# Patient Record
Sex: FEMALE | Race: WHITE | NOT HISPANIC OR LATINO | Employment: OTHER | URBAN - METROPOLITAN AREA
[De-identification: names, ages, dates, MRNs, and addresses within clinical notes are randomized per-mention and may not be internally consistent; named-entity substitution may affect disease eponyms.]

---

## 2018-03-30 ENCOUNTER — TRANSFERRED RECORDS (OUTPATIENT)
Dept: HEALTH INFORMATION MANAGEMENT | Facility: CLINIC | Age: 78
End: 2018-03-30

## 2018-04-03 LAB — POTASSIUM SERPL-SCNC: 4 MMOL/L (ref 3.5–5)

## 2018-04-04 ENCOUNTER — NURSING HOME VISIT (OUTPATIENT)
Dept: GERIATRICS | Facility: CLINIC | Age: 78
End: 2018-04-04
Payer: COMMERCIAL

## 2018-04-04 VITALS
OXYGEN SATURATION: 96 % | SYSTOLIC BLOOD PRESSURE: 129 MMHG | WEIGHT: 167 LBS | HEART RATE: 66 BPM | BODY MASS INDEX: 25.31 KG/M2 | DIASTOLIC BLOOD PRESSURE: 67 MMHG | RESPIRATION RATE: 16 BRPM | TEMPERATURE: 96.9 F | HEIGHT: 68 IN

## 2018-04-04 DIAGNOSIS — I73.9 PAD (PERIPHERAL ARTERY DISEASE) (H): ICD-10-CM

## 2018-04-04 DIAGNOSIS — I62.00 SUBDURAL HEMORRHAGE (H): Primary | ICD-10-CM
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
## 2018-04-04 DIAGNOSIS — S02.2XXD CLOSED FRACTURE OF NASAL BONE WITH ROUTINE HEALING: ICD-10-CM

## 2018-04-04 DIAGNOSIS — N39.0 URINARY TRACT INFECTION WITHOUT HEMATURIA, SITE UNSPECIFIED: ICD-10-CM

## 2018-04-04 DIAGNOSIS — I70.1 RENAL ARTERY STENOSIS (H): ICD-10-CM

## 2018-04-04 DIAGNOSIS — I60.9 SUBARACHNOID HEMORRHAGE (H): ICD-10-CM

## 2018-04-04 DIAGNOSIS — R53.81 PHYSICAL DECONDITIONING: ICD-10-CM

## 2018-04-04 DIAGNOSIS — N18.30 CKD (CHRONIC KIDNEY DISEASE) STAGE 3, GFR 30-59 ML/MIN (H): ICD-10-CM

## 2018-04-04 DIAGNOSIS — I10 ESSENTIAL HYPERTENSION: ICD-10-CM

## 2018-04-04 DIAGNOSIS — Z71.89 ADVANCED DIRECTIVES, COUNSELING/DISCUSSION: ICD-10-CM

## 2018-04-04 PROBLEM — N18.9 CHRONIC RENAL INSUFFICIENCY: Status: ACTIVE | Noted: 2018-04-04

## 2018-04-04 PROBLEM — R73.03 PRE-DIABETES: Status: ACTIVE | Noted: 2018-04-04

## 2018-04-04 PROBLEM — E78.2 MIXED HYPERLIPIDEMIA: Status: ACTIVE | Noted: 2018-04-04

## 2018-04-04 PROBLEM — R82.90 ABNORMAL URINALYSIS: Status: ACTIVE | Noted: 2018-04-04

## 2018-04-04 PROBLEM — R33.9 URINARY RETENTION: Status: ACTIVE | Noted: 2018-04-04

## 2018-04-04 PROBLEM — E88.810 METABOLIC SYNDROME X: Status: ACTIVE | Noted: 2018-04-04

## 2018-04-04 PROBLEM — G62.9 PERIPHERAL NEUROPATHY: Status: ACTIVE | Noted: 2018-04-04
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
## 2018-04-04 PROCEDURE — 99310 SBSQ NF CARE HIGH MDM 45: CPT | Performed by: NURSE PRACTITIONER

## 2018-04-04 RX ORDER — AMPICILLIN TRIHYDRATE 500 MG
1 CAPSULE ORAL DAILY
COMMUNITY
End: 2021-10-15

## 2018-04-04 RX ORDER — MULTIPLE VITAMINS W/ MINERALS TAB 9MG-400MCG
1 TAB ORAL DAILY
COMMUNITY
End: 2021-10-15

## 2018-04-04 NOTE — PROGRESS NOTES
[facility]  PRIMARY CARE PROVIDER AND CLINIC:  Devorah Aguilar MD  Chief Complaint   Patient presents with     Hospital F/U       HPI:    Sweetie Thao is a 77 year old  (1940),admitted to the [facility] TCU from [facility] .  Hospital stay 03/30/2018 through 04/03/2018.  Admitted to this facility for  rehab, medical management and nursing care.  HPI information obtained from: facility chart records, facility staff, patient report, [facility] chart review and Care Everywhere [facility] chart review.    She has a past medical history significant for PAD s/p angioplasty of right SFA, on plavix, renal artery stenosis, HTN, CKD and was hospitalized with facial trauma after falling outside on her way into [employer]. She missed a step and landed face down on the ground. CT head showed 3 mm subdural hematoma along the left tentorium, 2 mm subdural hematoma along the right aspect of the anterior faix cerebri,  1 cm subarachnoid hemorrhage along the right superior frontal gyrus, small subarachnoid hemorrhage of the right frontal lobe and minimally displaced left nasal bone fracture. Neurosurgery consulted and Plavix is on hold until follow up CT in 2 weeks.   UA showed >100 WBC and positive leuk estrace. She has a history of recurrent UTI and ESBL. UC >100,000 enterococcus. She was treated with IV ampicillin and discharged on amoxicillin.   Vascular Surgery consulted due to PAD. JASON on 4/2/2018 showed right mildly diminished resting JASON and patent pedal arteries. Left LE with borderline diminishing resting JASON.       Current issues are:         Subdural hemorrhage (H)-reports pain and mild headache are controlled. She has noticed slight word finding difficulty, but feels her cognition is at baseline. Has left rib pain and a flare of left sciatica pain, relieved with oxycodone. Also receiving aleve per hospital orders.   Subarachnoid hemorrhage (H)  Closed fracture of nasal  bone with routine healing-congestion and some difficulty breathing through her nose  Fall, subsequent encounter  PAD (peripheral artery disease) (H)-denies pain   Renal artery stenosis (H)  Essential hypertension-BPs: : 129/67, 118/71, 133/66   HR: 61-70  CKD (chronic kidney disease) stage 3, GFR 30-59 ml/min  Urinary tract infection without hematuria, site unspecified-reports symptoms are improving. Afebrile.   Physical deconditioning-up with walker and assist. Requires stand by assist with cares.     CODE STATUS/ADVANCE DIRECTIVES DISCUSSION:   CPR/Full code   Patient's living condition: lives alone    ALLERGIES:Amitriptyline; Contrast dye; Cymbalta; Dust mites; Gabapentin; Levofloxacin; Mold; and Nortriptyline  PAST MEDICAL HISTORY:  has a past medical history of Allergic rhinitis; Chronic renal insufficiency; Edema of both legs; Endometriosis; Essential hypertension; HDL lipoprotein deficiency; HTN (hypertension); Hypertriglyceridemia; Idiopathic neuropathy; Left knee DJD; Peripheral neuropathy; Pre-diabetes; Rash; Rectal polyp; Renal artery stenosis (H); and Toe ulcer, right (H).  PAST SURGICAL HISTORY:  has no past surgical history on file.  FAMILY HISTORY: family history includes Prostate Cancer in her father.  SOCIAL HISTORY:      Post Discharge Medication Reconciliation Status: discharge medications reconciled, continue medications without change.  Current Outpatient Prescriptions   Medication Sig Dispense Refill     ACETAMINOPHEN PO Take 650 mg by mouth every 4 hours as needed for pain       AMOXICILLIN PO Take 500 mg by mouth 2 times daily Until 4/13/18       ATORVASTATIN CALCIUM PO Take 80 mg by mouth daily       VITAMIN D, CHOLECALCIFEROL, PO Take 1,000 Units by mouth daily       Cranberry 450 MG CAPS Take 1 tablet by mouth daily       LACTOBACILLUS RHAMNOSUS, GG, PO Take 1 capsule by mouth daily       DOXAZOSIN MESYLATE PO Take 4 mg by mouth daily       HYDROCHLOROTHIAZIDE PO Take 25 mg by mouth daily   "     LISINOPRIL PO Take 20 mg by mouth 2 times daily       METOPROLOL TARTRATE PO Take 50 mg by mouth 2 times daily       multivitamin, therapeutic with minerals (MULTI-VITAMIN) TABS tablet Take 1 tablet by mouth daily       OMEGA 3-6-9 FATTY ACIDS PO Take 1 capsule by mouth daily       OXYCODONE HCL PO Take 5 mg by mouth every 4 hours as needed       PREGABALIN PO Take 75 mg by mouth 2 times daily       SPIRONOLACTONE PO Take 25 mg by mouth daily       vitamin B complex with vitamin C (VITAMIN  B COMPLEX) TABS tablet Take 1 tablet by mouth daily       Zinc 50 MG CAPS Take 50 mg by mouth daily         ROS:  10 point ROS of systems including Constitutional, Eyes, Respiratory, Cardiovascular, Gastroenterology, Genitourinary, Integumentary, Muscularskeletal, Psychiatric were all negative except for pertinent positives noted in my HPI.    Exam:  /67  Pulse 66  Temp 96.9  F (36.1  C)  Resp 16  Ht 5' 8\" (1.727 m)  Wt 167 lb (75.8 kg)  SpO2 96%  BMI 25.39 kg/m2  GENERAL APPEARANCE:  Alert, in no distress  ENT:  Mouth and posterior oropharynx normal, moist mucous membranes, normal hearing acuity  EYES:  EOM normal, conjunctiva and lids normal, PERRL  NECK:  No adenopathy,masses or thyromegaly  RESP:  respiratory effort and palpation of chest normal, lungs clear to auscultation , no respiratory distress  CV:  Palpation and auscultation of heart done , regular rate and rhythm, no murmur, rub, or gallop, no edema, +2 pedal pulses  ABDOMEN:  normal bowel sounds, soft, nontender, no hepatosplenomegaly or other masses  M/S:   up in chair. BEACH with good strength. No joint inflammation  SKIN:  periorbital and  facial ecchymosis, worse on left side. Bruising left breast, both knees and hands. No open areas, no rashes  PSYCH:  oriented X 3, normal insight, judgement and memory, affect and mood normal    Lab/Diagnostic data:  [facility] " labs:  4/3/18  POTASSIUM 4.0    4/2/18  SODIUM 139  POTASSIUM 3.6  CHLORIDE 106  CO2  25  BUN  21  CREATININE 0.91  GLUCOSE 130    3/31/18  WBC  6.3  RBC  4.16  HGB  12.3  HCT  37.1  MCV  89  MCH  29.6  MCHC  33.2  PLT  186  MPV  9.5    ASSESSMENT / PLAN:  (I62.00) Subdural hemorrhage (H)  (primary encounter diagnosis)  (I60.9) Subarachnoid hemorrhage (H)  (S02.2XXD) Closed fracture of nasal bone with routine healing  (W19.XXXD) Fall, subsequent encounter  Comment: pain controlled. Cognition appears close to her baseline.   Plan: therapies, including cognitive testing. Discontinue aleve. Continue tylenol, oxycodone. Neurosurgery follow up with CT in 2 weeks.     (I73.9) PAD (peripheral artery disease) (H)  Comment: chronic.  Plan: hold Plavix pending Neurosurgery follow up. Continue statin. Continue Lyrica for neuropathic pain. Follow up Vascular Surgery per usual routine.     (I70.1) Renal artery stenosis (H)  (I10) Essential hypertension  Comment: controlled on multi drug regimen.   Plan: continue spironolactone, metoprolol, lisinopril, HCTZ, doxazosin. Follow up with Cardiology as scheduled.     (N18.3) CKD (chronic kidney disease) stage 3, GFR 30-59 ml/min  Comment: renal function at baseline  Plan: follow BMP. Avoid nephrotoxins. Discontinue aleve.     (N39.0) Urinary tract infection without hematuria, site unspecified  Comment: recurrent UTIs, history of ESBL. Symptoms improving  Plan: continue amoxicillin    (Z71.89) Advanced directives, counseling/discussion  Comment: she has a healthcare directive and requests Full Code  Plan: POLST completed, epic orders updated    (R53.81) Physical deconditioning  Comment: related to trauma, hospitalization, multiple comorbidities  Plan: PHYSICAL THERAPY/OT. Goal is to return home with services.         Electronically signed by:  HUDSON Craig CNP

## 2018-04-05 VITALS
HEART RATE: 54 BPM | HEIGHT: 68 IN | OXYGEN SATURATION: 98 % | DIASTOLIC BLOOD PRESSURE: 62 MMHG | TEMPERATURE: 98.6 F | WEIGHT: 168.2 LBS | BODY MASS INDEX: 25.49 KG/M2 | RESPIRATION RATE: 18 BRPM | SYSTOLIC BLOOD PRESSURE: 104 MMHG

## 2018-04-06 ENCOUNTER — NURSING HOME VISIT (OUTPATIENT)
Dept: GERIATRICS | Facility: CLINIC | Age: 78
End: 2018-04-06
Payer: COMMERCIAL

## 2018-04-06 DIAGNOSIS — N39.0 URINARY TRACT INFECTION DUE TO ENTEROCOCCUS: ICD-10-CM

## 2018-04-06 DIAGNOSIS — S09.93XD FACIAL INJURY, SUBSEQUENT ENCOUNTER: ICD-10-CM

## 2018-04-06 DIAGNOSIS — I70.1 RENAL ARTERY STENOSIS (H): ICD-10-CM

## 2018-04-06 DIAGNOSIS — E78.5 HYPERLIPIDEMIA LDL GOAL <100: ICD-10-CM

## 2018-04-06 DIAGNOSIS — S02.2XXD CLOSED FRACTURE OF NASAL BONE WITH ROUTINE HEALING: ICD-10-CM

## 2018-04-06 DIAGNOSIS — I62.00 SUBDURAL HEMORRHAGE (H): ICD-10-CM

## 2018-04-06 DIAGNOSIS — I10 ESSENTIAL HYPERTENSION: ICD-10-CM

## 2018-04-06 DIAGNOSIS — I73.9 PVD (PERIPHERAL VASCULAR DISEASE) (H): ICD-10-CM

## 2018-04-06 DIAGNOSIS — I60.9 SUBARACHNOID HEMORRHAGE (H): ICD-10-CM

## 2018-04-06 DIAGNOSIS — R53.81 PHYSICAL DECONDITIONING: Primary | ICD-10-CM

## 2018-04-06 DIAGNOSIS — B95.2 URINARY TRACT INFECTION DUE TO ENTEROCOCCUS: ICD-10-CM

## 2018-04-06 PROCEDURE — 99306 1ST NF CARE HIGH MDM 50: CPT | Performed by: INTERNAL MEDICINE

## 2018-04-06 NOTE — PROGRESS NOTES
PRIMARY CARE PROVIDER AND CLINIC RESPONSIBLE:  Devorah Aguilar [facility] [address] / [place] [address]        ADMISSION HISTORY AND PHYSICAL EXAMINATION     Chief Complaint   Patient presents with     Fatigue     Fall     Facial Injury         HISTORY OF PRESENT ILLNESS:  77 year old female, (1940), [facility].  HPI information obtained from: facility chart records, facility staff, patient report, [facility] chart review and Care [facility] chart review.    Sweetie Thao is a 77 year old female with history of hyperlipidemia, PVD, hypertension, renal artery stenosis, and peripheral neuropathy who was admitted at [facility] from 3/30 to 4/3/18 due to mechanical fall at out [employer] store front door as she missed a step, but patient does not recall how it happened. Head CT demonstrated a 3 mm subdural hematoma along the left tentorium, 2 mm subdural hematoma along the right aspect of the anterior falx cerebri, a 1 cm hyperdense focus along the right superior frontal gyrus likely represents a small hemorrhagic contusion or subarachnoid hemorrhage, subarachnoid hemorrhage the lateral aspect of the right frontal lobe, displaced left nasal bone fracture, and left facial and periorbital posttraumatic swelling.  She was admitted in intensive care unit.  She had mild bradycardia and fluctuation of blood pressures.  She was seen by neurosurgery team and neurology team.  She was also found to have an acute enterococcus urinary tract infection.  She was initially treated with IV Zosyn and switch her to IV ampicillin and discharged with oral amoxicillin.  She also found to have some cognitive issues during evaluation by Occupational Therapy.  She has known peripheral vascular disease status post angioplasty of right facial femoral artery and was on Plavix.  Plavix was held.  Vascular surgery team was consulted and  recommended to be hold Plavix for additional 2 weeks.  She had resting JASON was obtained.  She feels weak and tired.  She is alert oriented and verbal. Slept well, appetite fine and no BM for several days.  She had left shoulder pain and loss pain and also bilateral rib cage pain. Patient denies chest pain, dyspnea, abdominal pain, n&v, fever, chills, dysuria, leg pain or swelling. The rest of ROS is unremarkable. Patient is seen and examined by me with Cruz Penn NP. Please see CHRISTIAN Penn's admit noted dated 4/4/18 for details of admission, past medical history, family history, allergies, medication list, social history and other details pertinent with this admission. Hospital admission and dc summary reviewed.    Past Medical History:   Diagnosis Date     Allergic rhinitis      Chronic renal insufficiency      Edema of both legs      Endometriosis      Essential hypertension      HDL lipoprotein deficiency      HTN (hypertension)      Hypertriglyceridemia      Idiopathic neuropathy      Left knee DJD      Peripheral neuropathy      Pre-diabetes      Rash      Rectal polyp      Renal artery stenosis (H)      Toe ulcer, right (H)        No past surgical history on file.    Current Outpatient Prescriptions   Medication Sig     ACETAMINOPHEN PO Take 650 mg by mouth every 4 hours as needed for pain     AMOXICILLIN PO Take 500 mg by mouth 2 times daily Until 4/13/18     ATORVASTATIN CALCIUM PO Take 80 mg by mouth daily     VITAMIN D, CHOLECALCIFEROL, PO Take 1,000 Units by mouth daily     Cranberry 450 MG CAPS Take 1 tablet by mouth daily     LACTOBACILLUS RHAMNOSUS, GG, PO Take 1 capsule by mouth daily     DOXAZOSIN MESYLATE PO Take 4 mg by mouth daily     HYDROCHLOROTHIAZIDE PO Take 25 mg by mouth daily     LISINOPRIL PO Take 20 mg by mouth 2 times daily     METOPROLOL TARTRATE PO Take 50 mg by mouth 2 times daily     multivitamin, therapeutic with minerals (MULTI-VITAMIN) TABS tablet Take 1 tablet by mouth daily      "OMEGA 3-6-9 FATTY ACIDS PO Take 1 capsule by mouth daily     OXYCODONE HCL PO Take 5 mg by mouth every 4 hours as needed     PREGABALIN PO Take 75 mg by mouth 2 times daily     SPIRONOLACTONE PO Take 25 mg by mouth daily     vitamin B complex with vitamin C (VITAMIN  B COMPLEX) TABS tablet Take 1 tablet by mouth daily     Zinc 50 MG CAPS Take 50 mg by mouth daily     No current facility-administered medications for this visit.        Allergies   Allergen Reactions     Amitriptyline      Contrast Dye      Cymbalta      Dust Mites      Gabapentin      Levofloxacin      Mold      Nortriptyline        Social History     Social History     Marital status:      Spouse name: N/A     Number of children: N/A     Years of education: N/A     Occupational History     Not on file.     Social History Main Topics     Smoking status: Not on file     Smokeless tobacco: Not on file     Alcohol use Not on file     Drug use: Not on file     Sexual activity: Not on file     Other Topics Concern     Not on file     Social History Narrative     No narrative on file          Information reviewed:  Medications, vital signs, orders, nursing notes, problem list, hospital information.     ROS: All 10 point review of system completed, those pertinent positive, please see H&P, the remaining ROS is negative.    /62  Pulse 54  Temp 98.6  F (37  C)  Resp 18  Ht 5' 8\" (1.727 m)  Wt 168 lb 3.2 oz (76.3 kg)  SpO2 98%  BMI 25.57 kg/m2    PHYSICAL EXAMINATION:   GENERAL:  No acute distress.  SKIN:  Dry and warm.  There is extensive bruise/ecchymosis of face, more at left side face extending to neck. Lower lip laceration repaired.  HEENT:  Head without trauma.  Pupils round, reactive. Periorbital ecchymosis. Exam of conjunctiva and lids are normal. Sclera without icterus. There is no oral thrush. Nose tenderness.  NECK:  Supple. No jugular venous distension.  CHEST: No reproducible chest tenderness.   LUNGS:  Normal respiratory " effort. Lungs reveal decreased breath sounds at bases. No rales or wheezes.  HEART:  Regular rate and rhythm.  No murmur, gallops or rubs auscultated.  ABDOMEN:  Soft, bowel sounds positive.  There is no tenderness or guarding.   EXTREMITIES: No edema. Bilateral knee bruises, left shoulder tenderness.  NEUROLOGIC:  Alert and oriented x3. Moving all ext. Gait not tested.  PSYCH: Recent and remote memory is normal. Mood and affect are normal.    Lab/Diagnostic data:  Reviewed    Basic metabolic panel AM (04/02/2018 5:33 AM)  Only the most recent of 4 results within the time period is included.    Basic metabolic panel AM (04/02/2018 5:33 AM)   Component Value Ref Range   SODIUM 139 135 - 145 mmol/L   POTASSIUM 3.6 3.5 - 5.0 mmol/L   CHLORIDE 106 98 - 110 mmol/L   CO2,TOTAL 25 21 - 31 mmol/L   ANION GAP 8 5 - 18    GLUCOSE 130 (H) 65 - 100 mg/dL   CALCIUM 9.3 8.5 - 10.5 mg/dL   BUN 21 8 - 25 mg/dL   CREATININE 0.91 0.57 - 1.11 mg/dL   BUN/CREAT RATIO  23 (H) 10 - 20    GFR if African American >60 >60 ml/min/1.73m2   GFR if not African American 60 (L) >60 ml/min/1.73m2     Basic metabolic panel AM (04/02/2018 5:33 AM)   Specimen Performing Laboratory   Blood [facility] LABORATORY-CENTRAL LABORATORY      CBC WITH AUTO DIFFERENTIAL (03/31/2018 4:35 AM)  CBC WITH AUTO DIFFERENTIAL (03/31/2018 4:35 AM)   Component Value Ref Range   WHITE BLOOD COUNT  6.3 4.5 - 11.0 thou/cu mm   RED BLOOD COUNT  4.16 4.00 - 5.20 mil/cu mm   HEMOGLOBIN  12.3 12.0 - 16.0 g/dL   HEMATOCRIT  37.1 33.0 - 51.0 %   MCV  89 80 - 100 fL   MCH  29.6 26.0 - 34.0 pg   MCHC  33.2 32.0 - 36.0 g/dL   RDW  14.0 11.5 - 15.5 %   PLATELET COUNT  186 140 - 440 thou/cu mm   MPV  9.5 6.5 - 11.0 fL   NEUTROPHILS  61.1 42.0 - 72.0 %   LYMPHOCYTES  25.0 20.0 - 44.0 %   MONOCYTES  7.2 <12.0 %   EOSINOPHILS  5.9 <8.0 %   BASOPHILS  0.6 <3.0 %   IMMATURE GRANULOCYTES(METAS,MYELOS,PROS) 0.2 %   ABSOLUTE NEUTROPHILS  3.8 1.7 - 7.0 thou/cu mm   ABSOLUTE LYMPHOCYTES   1.6 0.9 - 2.9 thou/cu mm   ABSOLUTE MONOCYTES  0.5 <0.9 thou/cu mm   ABSOLUTE EOSINOPHILS  0.4 <0.5 thou/cu mm   ABSOLUTE BASOPHILS  0.0 <0.3 thou/cu mm   ABSOLUTE IMMATURE GRANULOCYTES(METAS,MYELOS,PROS) 0.0 <0.3 thou/cu mm     CBC WITH AUTO DIFFERENTIAL (03/31/2018 4:35 AM)   Specimen Performing Laboratory   [facility]       URINE CULTURE (03/31/2018 11:31 AM)  URINE CULTURE (03/31/2018 11:31 AM)   Component Value Ref Range   CULTURE RESULT (A)     CULTURE >100,000 CFU/mL Enterococcus species       URINE CULTURE (03/31/2018 11:31 AM)   Specimen Performing Laboratory   Urine [facility] LABORATORY    [address]    [place] [address]       URINE CULTURE (03/31/2018 11:31 AM)   Organism Antibiotic Method Susceptibility   Enterococcus species GENTAMICIN SYNERGY   S     Enterococcus species STREPTOMYCIN SYNERGY   R     Enterococcus species VANCOMYCIN   2: S     Enterococcus species LEVOFLOXACIN   1: S     Enterococcus species AMPICILLIN   <=2: S     Enterococcus species NITROFURANTOIN   <=16: S     CT HEAD BRAIN AND FACIAL BONES WO3[facility]30/2018  [facility]  Result Narrative   Indication:  Fall, facial trauma.     Technique:  Noncontrast axial CT of the head is submitted. Helical axial sections were obtained through the facial skeleton, mandible and adjacent structures without intravenous contrast material. Data was reformatted not only in axial but also coronal planes.    Comparison:  No prior studies available for comparison at this institution.    Findings:  There is a 11 mm hyperdense focus adjacent to the right frontal cortex consistent with small subarachnoid hemorrhage hyperdensity along the right posterolateral frontal lobe consistent with subarachnoid hemorrhage (series 2, image 73). There is a 3 mm subdural hematoma along the left tentorium and 2 mm subdural hematoma along the right aspect of the  anterior falx cerebri. There is no midline shift.  No hydrocephalus. Gray-white matter differentiation is preserved. Mild parenchymal volume loss. The basilar cisterns are clear. No calvarial fracture.     Left facial and periorbital posttraumatic swelling. Minimally displaced left nasal bone fracture.No fracture is demonstrated in the mandible. The orbits and their contents are normal in appearance. There is no evidence for penetrating injury to the ocular globes. The lenses are situated in their normally expected anterior locations. No radiodense or metallic foreign body is demonstrated.     No significant abnormality is demonstrated in the sinonasal cavities or adjacent structures. The sinonasal cavities are clear. The ostiomeatal complexes on each side are structurally normal and widely patent. The mastoid air cells are clear. Degenerative changes of the right temporomandibular joint.    Impression:  1. There is a 3 mm subdural hematoma along the left tentorium  2. There is a 2 mm subdural hematoma along the right aspect of the anterior falx cerebri.   3. An oval 1 cm hyperdense focus along the right superior frontal gyrus likely represents a small hemorrhagic contusion or subarachnoid hemorrhage.   4. Small subarachnoid hemorrhage the lateral aspect of the right frontal lobe.   5. Minimally displaced left nasal bone fracture.  6. Left facial and periorbital posttraumatic swelling.      US ANKLE BRACHIAL INDEX BILATERAL4/2/2018  [facility]  Result Narrative   Exam:  The pedal arteries were evaluated with gray-scale ultrasound, color flow and Doppler spectral analysis. Peak systolic velocities (PSV) and/or Doppler waveform quality were documented at each vessel. Ankle/brachial indices were measured and calculated by placing appropriately sized BP cuffs at ankles and upper arms bilaterally.    Indication:  Nonhealing right foot wound.    Comparison:  Ultrasound ankle-brachial  index dated 10/10/2017.    Findings:  Maximum systolic blood pressure in the right brachial artery: 143 mmHg.  Maximum systolic blood pressure in the left brachial artery: 162 mmHg.    Right lower extremity:  PT: 141 mmHg (JASON 0.87); 118 cm/sec; multiphasic above the baseline flow.  DP: 136 mmHg (JASON 0.84); 59 cm/sec; biphasic flow.  -Of note, the DPA was not visualized on 10/10/2017.  Prior JASON: 0.92 (10/10/2017)    Left lower extremity:  PT: 157 mmHg (JASON 0.97); 180 cm/sec; multiphasic above the baseline flow.  DP: 148 mmHg (JASON 0.91); 44 cm/sec; multiphasic above the baseline flow.  Prior JASON: 0.92 (10/10/2017)    Impression:  1. Right:  -Mildly diminished resting JASON which is mildly decreased since 10/10/2017.  -Patent pedal arteries with multiphasic above the baseline flow in the PTA and biphasic flow in the DPA.  -Of note, the DPA was not visualized on 10/10/2017.    2. Left:  -Borderline diminished resting JASON is mildly increased since 10/10/2017.  -Patent pedal arteries with multiphasic above the baseline flow which was biphasic on 10/10/2017.    3. Elevated blood pressures, improved compared with 10/10/2017.         ASSESSMENT / PLAN:     Physical deconditioning  Plan: PT/OT with increase independence, self-care, strength and endurance and other cares that help return to home/facility of origin, fall precautions. Care conference with patient and family for the progress of rehab and disposition issues will be discussed as planned. Rehab evaluation and other evaluations including CPT are at rehab logs, to be reviewed separately.  Fall risk assessment as well as cognitive evaluation will be formed during rehab stay if indicated.    Subdural hemorrhage (H) and Subarachnoid hemorrhage (H) secondary trauma related to fall  Plan: continue PT/OT, fall precautions.  Follow-up with neurosurgery team as scheduled and reviewed CT scan of the head.  Will hold Plavix for now    Closed fracture of nasal bone with routine  healing and Facial injury, subsequent encounter secondary trauma related to fall  Plan: continue PT/OT, fall precautions.    Urinary tract infection due to Enterococcus  Plan: Continue amoxicillin to complete a course of antibiotic.    Essential hypertension and Renal artery stenosis (H)  Plan: Continue metoprolol and lisinopril with parameters. Blood pressure stable.  Monitor closely to creatinine which is normal this patient is on lisinopril in the setting of renal artery stenosis.    PVD (peripheral vascular disease) (H)  Plan:  Will hold Plavix for now.  Follow-up vascular surgery team as scheduled.    Hyperlipidemia LDL goal <100  Plan: continue current medication Statins.      Other problems with same care. Primary care doctor and other specialists to address those chronic problems in next clinic appointment to be scheduled upon discharge from the TCU.      Total time spent with patient visit was 36 min including patient visit, review of past records, patients counseling and coordinating care.        Alex Knapp MD

## 2018-04-06 NOTE — MR AVS SNAPSHOT
"              After Visit Summary   4/6/2018    Sweetie Thao    MRN: [id number]           Patient Information     Date Of Birth          1940        Visit Information        Provider Department      4/6/2018 6:30 AM Alex Knapp MD [facility]        Today's Diagnoses     Physical deconditioning    -  1    Subdural hemorrhage (H)        Subarachnoid hemorrhage (H)        Closed fracture of nasal bone with routine healing        Facial injury, subsequent encounter        Urinary tract infection due to Enterococcus        Essential hypertension        Renal artery stenosis (H)        PVD (peripheral vascular disease) (H)        Hyperlipidemia LDL goal <100           Follow-ups after your visit        Who to contact     If you have questions or need follow up information about today's clinic visit or your schedule please contact [facility] directly at [phone].  Normal or non-critical lab and imaging results will be communicated to you by [employer], letter or phone within 4 business days after the clinic has received the results. If you do not hear from us within 7 days, please contact the clinic through [employer] or phone. If you have a critical or abnormal lab result, we will notify you by phone as soon as possible.  Submit refill requests through [employer] or call your pharmacy and they will forward the refill request to us. Please allow 3 business days for your refill to be completed.          Additional Information About Your Visit        Bivarushart Information     [employer] lets you send messages to your doctor, view your test results, renew your prescriptions, schedule appointments and more. To sign up, go to [email or web address] . Click on \"Log in\" on the left side of the screen, which will take you to the Welcome page. Then click on \"Sign up Now\" on the right side of the page.     You will be asked to enter the access code listed below, as well as some personal " "information. Please follow the directions to create your username and password.     Your access code is: [email or web address]  Expires: 2018 11:47 AM     Your access code will  in 90 days. If you need help or a new code, please call your [place] clinic or [phone].        Care EveryWhere ID     This is your Care EveryWhere ID. This could be used by other organizations to access your [place] medical records  [id number]        Your Vitals Were     Pulse Temperature Respirations Height Pulse Oximetry BMI (Body Mass Index)    54 98.6  F (37  C) 18 5' 8\" (1.727 m) 98% 25.57 kg/m2       Blood Pressure from Last 3 Encounters:   18 104/62   18 129/67    Weight from Last 3 Encounters:   18 168 lb 3.2 oz (76.3 kg)   18 167 lb (75.8 kg)              Today, you had the following     No orders found for display       Primary Care Provider Office Phone # Fax #    Devorah Aguilar [phone]       [facility] [place] [address]  [place] [address]        Equal Access to Services     JEWELL DICK AH: Hadii ruben huang hadnathalieo Soshaniquaali, waaxda luqadaha, qaybta kaalmada adeegyada, marci schumacher . So [facility] [phone].    ATENCIÓN: Si habla español, tiene a simpson disposición servicios gratuitos de asistencia lingüística. Llame al [phone].    We comply with applicable federal civil rights laws and [place] laws. We do not discriminate on the basis of race, color, national origin, age, disability, sex, sexual orientation, or gender identity.            Thank you!     Thank you for choosing [facility]  for your care. Our goal is always to provide you with excellent care. Hearing back from our patients is one way we can continue to improve our services. Please take a few minutes to complete the written survey that you may receive in the mail after your visit with us. Thank you!             Your Updated Medication List - Protect others around you: " Learn how to safely use, store and throw away your medicines at www.disposemymeds.org.          This list is accurate as of 4/6/18 11:47 AM.  Always use your most recent med list.                   Brand Name Dispense Instructions for use Diagnosis    ACETAMINOPHEN PO      Take 650 mg by mouth every 4 hours as needed for pain        AMOXICILLIN PO      Take 500 mg by mouth 2 times daily Until 4/13/18        ATORVASTATIN CALCIUM PO      Take 80 mg by mouth daily        Cranberry 450 MG Caps      Take 1 tablet by mouth daily        DOXAZOSIN MESYLATE PO      Take 4 mg by mouth daily        HYDROCHLOROTHIAZIDE PO      Take 25 mg by mouth daily        LACTOBACILLUS RHAMNOSUS (GG) PO      Take 1 capsule by mouth daily        LISINOPRIL PO      Take 20 mg by mouth 2 times daily        METOPROLOL TARTRATE PO      Take 50 mg by mouth 2 times daily        Multi-vitamin Tabs tablet      Take 1 tablet by mouth daily        OMEGA 3-6-9 FATTY ACIDS PO      Take 1 capsule by mouth daily        OXYCODONE HCL PO      Take 5 mg by mouth every 4 hours as needed        PREGABALIN PO      Take 75 mg by mouth 2 times daily        SPIRONOLACTONE PO      Take 25 mg by mouth daily        vitamin B complex with vitamin C Tabs tablet      Take 1 tablet by mouth daily        VITAMIN D (CHOLECALCIFEROL) PO      Take 1,000 Units by mouth daily        Zinc 50 MG Caps      Take 50 mg by mouth daily

## 2018-04-10 ENCOUNTER — NURSING HOME VISIT (OUTPATIENT)
Dept: GERIATRICS | Facility: CLINIC | Age: 78
End: 2018-04-10
Payer: COMMERCIAL

## 2018-04-10 VITALS
OXYGEN SATURATION: 96 % | TEMPERATURE: 98.5 F | HEIGHT: 68 IN | WEIGHT: 168.6 LBS | SYSTOLIC BLOOD PRESSURE: 121 MMHG | HEART RATE: 54 BPM | DIASTOLIC BLOOD PRESSURE: 70 MMHG | BODY MASS INDEX: 25.55 KG/M2 | RESPIRATION RATE: 16 BRPM

## 2018-04-10 DIAGNOSIS — N18.30 CKD (CHRONIC KIDNEY DISEASE) STAGE 3, GFR 30-59 ML/MIN (H): ICD-10-CM

## 2018-04-10 DIAGNOSIS — S02.2XXD CLOSED FRACTURE OF NASAL BONE WITH ROUTINE HEALING: ICD-10-CM

## 2018-04-10 DIAGNOSIS — I62.00 SUBDURAL HEMORRHAGE (H): Primary | ICD-10-CM

## 2018-04-10 DIAGNOSIS — I10 ESSENTIAL HYPERTENSION: ICD-10-CM

## 2018-04-10 DIAGNOSIS — I60.9 SUBARACHNOID HEMORRHAGE (H): ICD-10-CM

## 2018-04-10 DIAGNOSIS — N39.0 URINARY TRACT INFECTION WITHOUT HEMATURIA, SITE UNSPECIFIED: ICD-10-CM

## 2018-04-10 DIAGNOSIS — R53.81 PHYSICAL DECONDITIONING: ICD-10-CM

## 2018-04-10 DIAGNOSIS — I70.1 RENAL ARTERY STENOSIS (H): ICD-10-CM

## 2018-04-10 PROCEDURE — 99309 SBSQ NF CARE MODERATE MDM 30: CPT | Performed by: NURSE PRACTITIONER

## 2018-04-10 NOTE — PROGRESS NOTES
[facility]    Chief Complaint   Patient presents with     RECHECK       HPI:    Sweetie Thao is a 77 year old  (1940), who is being seen today for [facility]  HPI information obtained from: facility chart records, facility staff, patient report, [facility] chart review and Care Everywhere [facility] chart review.    She came to this facility 4/3/2018 for short term rehab and medical management following hospitalization for  facial trauma after falling outside on her way into [facility].  She missed a step and landed face down on the ground. CT head showed 3 mm subdural hematoma along the left tentorium, 2 mm subdural hematoma along the right aspect of the anterior faix cerebri,  1 cm subarachnoid hemorrhage along the right superior frontal gyrus, small subarachnoid hemorrhage of the right frontal lobe and minimally displaced left nasal bone fracture. Neurosurgery consulted and Plavix is on hold until follow up CT in 2 weeks.   UA showed >100 WBC and positive leuk estrace. She has a history of recurrent UTI and ESBL. UC >100,000 enterococcus. She was treated with IV ampicillin and discharged on amoxicillin.    Current issues are:         Subdural hemorrhage (H)-reports feeling better but has left sciatica pain this morning after not taking pain meds at HS. Has taken oxycodone and pain is improving. Sciatica pain has flared since the fall. Denies headache. Face is less painful. Good appetite.  Subarachnoid hemorrhage (H)  Closed fracture of nasal bone with routine healing  Renal artery stenosis (H)  Essential hypertension-BPs:  121/70, 153/86, 92/59  HR: 52-58  CKD (chronic kidney disease) stage 3, GFR 30-59 ml/min  Urinary tract infection without hematuria, site unspecified-remains on amoxicillin. Denies urinary symptoms. Afebrile. No diarrhea.   Physical deconditioning-up with walker and assist. Requires stand by assist with cares.     ALLERGIES:  Amitriptyline; Contrast dye; Cymbalta; Dust mites; Gabapentin; Levofloxacin; Mold; and Nortriptyline  Past Medical, Surgical, Family and Social History reviewed and updated in [facility].    Current Outpatient Prescriptions   Medication Sig Dispense Refill     ACETAMINOPHEN PO Take 650 mg by mouth every 4 hours as needed for pain       ATORVASTATIN CALCIUM PO Take 80 mg by mouth daily       VITAMIN D, CHOLECALCIFEROL, PO Take 1,000 Units by mouth daily       Cranberry 450 MG CAPS Take 1 tablet by mouth daily       LACTOBACILLUS RHAMNOSUS, GG, PO Take 1 capsule by mouth daily       DOXAZOSIN MESYLATE PO Take 4 mg by mouth daily       HYDROCHLOROTHIAZIDE PO Take 25 mg by mouth daily       LISINOPRIL PO Take 20 mg by mouth 2 times daily       METOPROLOL TARTRATE PO Take 50 mg by mouth 2 times daily       multivitamin, therapeutic with minerals (MULTI-VITAMIN) TABS tablet Take 1 tablet by mouth daily       OMEGA 3-6-9 FATTY ACIDS PO Take 1 capsule by mouth daily       OXYCODONE HCL PO Take 5 mg by mouth every 4 hours as needed       PREGABALIN PO Take 75 mg by mouth 2 times daily       SPIRONOLACTONE PO Take 25 mg by mouth daily       vitamin B complex with vitamin C (VITAMIN  B COMPLEX) TABS tablet Take 1 tablet by mouth daily       Zinc 50 MG CAPS Take 50 mg by mouth daily       Medications reviewed:  Medications reconciled to facility chart and changes were made to reflect current medications as identified as above med list. Below are the changes that were made:   Medications stopped since last EPIC medication reconciliation:   There are no discontinued medications.    Medications started since last [facility] medication reconciliation:  No orders of the defined types were placed in this encounter.    REVIEW OF SYSTEMS:  10 point ROS of systems including Constitutional, Eyes, Respiratory, Cardiovascular, Gastroenterology, Genitourinary, Integumentary, Muscularskeletal, Psychiatric were all negative except for pertinent positives  "noted in my HPI.    Physical Exam:  /70  Pulse 54  Temp 98.5  F (36.9  C)  Resp 16  Ht 5' 8\" (1.727 m)  Wt 168 lb 9.6 oz (76.5 kg)  SpO2 96%  BMI 25.64 kg/m2  GENERAL APPEARANCE:  Alert, in no distress  ENT:  Mouth and posterior oropharynx normal, moist mucous membranes, normal hearing acuity  EYES:  EOM normal, conjunctiva and lids normal  NECK:  No adenopathy,masses or thyromegaly  RESP:  respiratory effort and palpation of chest normal, lungs clear to auscultation , no respiratory distress  CV:  Palpation and auscultation of heart done , regular rate and rhythm, no murmur,  no edema, +2 pedal pulses  ABDOMEN: soft, nontender, no hepatosplenomegaly or other masses  M/S:   Gait steady with walker. BEACH with good strength. No joint inflammation  SKIN:  Fading periorbital and  facial ecchymosis. No visible open areas, no rashes  PSYCH:  oriented X 3, normal insight, judgement and memory, affect and mood normal    Recent Labs:   Last Basic Metabolic Panel:  Recent Labs   Lab Test 04/03/18   POTASSIUM  4.0     [facility] labs:  4/3/18  POTASSIUM 4.0    4/2/18  SODIUM 139  POTASSIUM 3.6  CHLORIDE 106  CO2  25  BUN  21  CREATININE 0.91  GLUCOSE 130    3/31/18  WBC  6.3  RBC  4.16  HGB  12.3  HCT  37.1  MCV  89  MCH  29.6  MCHC  33.2  PLT  186  MPV  9.5    ASSESSMENT / PLAN:  (I62.00) Subdural hemorrhage (H)  (primary encounter diagnosis)  (I60.9) Subarachnoid hemorrhage (H)  (S02.2XXD) Closed fracture of nasal bone with routine healing  Comment: pain improving. Facial bruising slowly resolving. Cognition is at baseline  Plan: continue tylenol, oxycodone. Plavix (for PAD) remains on hold pending Neurosurgery appointment 4/24/218    (I70.1) Renal artery stenosis (H)  (I10) Essential hypertension  Comment: controlled  Plan: continue spironolactone, metoprolol, lisinopril, HCTZ, doxazosin. Follow up with Cardiology as scheduled.    (N18.3) CKD (chronic kidney disease) stage 3, GFR 30-59 ml/min  Comment: " renal function at baseline  Plan: Hgb,  BMP    (N39.0) Urinary tract infection without hematuria, site unspecified  Comment: appears resolved  Plan:last dose amoxicillin 4/13/2018. Monitor for recurrent symptoms     (R53.81) Physical deconditioning  Comment: progressing in therapies  Plan: continue PHYSICAL THERAPY/OT. Goal is to return home with services.         Electronically signed by  HUDSON Craig CNP

## 2018-04-12 ENCOUNTER — TRANSFERRED RECORDS (OUTPATIENT)
Dept: HEALTH INFORMATION MANAGEMENT | Facility: CLINIC | Age: 78
End: 2018-04-12

## 2018-04-12 LAB
ANION GAP SERPL CALCULATED.3IONS-SCNC: 8 MMOL/L (ref 3–14)
BUN SERPL-MCNC: 37 MG/DL (ref 7–30)
CALCIUM SERPL-MCNC: 8.6 MG/DL (ref 8.5–10.1)
CHLORIDE SERPLBLD-SCNC: 103 MMOL/L (ref 94–109)
CO2 SERPL-SCNC: 28 MMOL/L (ref 20–32)
CREAT SERPL-MCNC: 1.12 MG/DL (ref 0.52–1.04)
GFR SERPL CREATININE-BSD FRML MDRD: 47 ML/MIN/1.73M2
GLUCOSE SERPL-MCNC: 103 MG/DL (ref 70–99)
HEMOGLOBIN: 12.5 G/DL (ref 11.7–15.7)
POTASSIUM SERPL-SCNC: 3.8 MMOL/L (ref 3.4–5.3)
SODIUM SERPL-SCNC: 139 MMOL/L (ref 133–144)

## 2018-04-13 ENCOUNTER — NURSING HOME VISIT (OUTPATIENT)
Dept: GERIATRICS | Facility: CLINIC | Age: 78
End: 2018-04-13
Payer: COMMERCIAL

## 2018-04-13 VITALS
BODY MASS INDEX: 25.55 KG/M2 | HEART RATE: 51 BPM | OXYGEN SATURATION: 98 % | HEIGHT: 68 IN | SYSTOLIC BLOOD PRESSURE: 117 MMHG | TEMPERATURE: 96.6 F | RESPIRATION RATE: 18 BRPM | DIASTOLIC BLOOD PRESSURE: 60 MMHG | WEIGHT: 168.6 LBS

## 2018-04-13 DIAGNOSIS — L97.511 SKIN ULCER OF TOE OF RIGHT FOOT, LIMITED TO BREAKDOWN OF SKIN (H): Primary | ICD-10-CM

## 2018-04-13 DIAGNOSIS — I73.9 PAD (PERIPHERAL ARTERY DISEASE) (H): ICD-10-CM

## 2018-04-13 PROCEDURE — 99308 SBSQ NF CARE LOW MDM 20: CPT | Performed by: NURSE PRACTITIONER

## 2018-04-13 NOTE — PROGRESS NOTES
"[facility]    Chief Complaint   Patient presents with     RECHECK       HPI:    Sweetie Thao is a 77 year old  (1940), who is being seen today for an episodic care visit at [facility] [place].    HPI information obtained from: facility chart records, facility staff, patient report and [facility] chart review.   She came to this facility 4/3/2018 for short term rehab and medical management following hospitalization for  facial trauma after falling outside on her way into [employer].  She missed a step and landed face down on the ground. CT head showed 3 mm subdural hematoma along the left tentorium, 2 mm subdural hematoma along the right aspect of the anterior faix cerebri,  1 cm subarachnoid hemorrhage along the right superior frontal gyrus, small subarachnoid hemorrhage of the right frontal lobe and minimally displaced left nasal bone fracture. Neurosurgery consulted and Plavix is on hold until follow up CT in 2 weeks.   UA showed >100 WBC and positive leuk estrace. She has a history of recurrent UTI and ESBL. UC >100,000 enterococcus. She was treated with IV ampicillin and discharged on amoxicillin      Today's concern is:     Skin ulcer of toe of right foot, limited to breakdown of skin (H)  PAD (peripheral artery disease) (H)-she's had an ulcer of her right great toe for the past year and a half related to neuropathy and PAD. Followed by in home [facility] and reports multiple treatments have been tried, most recently silvadene cream. Denies pain. Afebrile.     REVIEW OF SYSTEMS:  4 point ROS including Respiratory, CV, GI and , other than that noted in the HPI,  is negative    /60  Pulse 51  Temp 96.6  F (35.9  C)  Resp 18  Ht 5' 8\" (1.727 m)  Wt 168 lb 9.6 oz (76.5 kg)  SpO2 98%  BMI 25.64 kg/m2  GENERAL APPEARANCE:  Alert, in no distress  Ulcer over bony prominence right great toe-superficial, clean, scant bloody drainage, no erythema. No other open " areas    ASSESSMENT / PLAN:  (L97.511) Skin ulcer of toe of right foot, limited to breakdown of skin (H)  (primary encounter diagnosis)  (I73.9) PAD (peripheral artery disease) (H)  Comment: chronic ulcer of right great toe. No signs of infection  Plan: daily wound care with silvadene and monitor. She will follow up with her wound specialist after tcu discharge.         Electronically signed by:  HUDSON Craig CNP

## 2018-04-15 PROBLEM — L97.511 SKIN ULCER OF TOE OF RIGHT FOOT, LIMITED TO BREAKDOWN OF SKIN (H): Status: ACTIVE | Noted: 2018-04-15

## 2018-04-17 ENCOUNTER — DISCHARGE SUMMARY NURSING HOME (OUTPATIENT)
Dept: GERIATRICS | Facility: CLINIC | Age: 78
End: 2018-04-17
Payer: COMMERCIAL

## 2018-04-17 VITALS
RESPIRATION RATE: 16 BRPM | HEIGHT: 68 IN | DIASTOLIC BLOOD PRESSURE: 79 MMHG | BODY MASS INDEX: 25.55 KG/M2 | TEMPERATURE: 96.2 F | HEART RATE: 57 BPM | SYSTOLIC BLOOD PRESSURE: 144 MMHG | OXYGEN SATURATION: 96 % | WEIGHT: 168.6 LBS

## 2018-04-17 DIAGNOSIS — I10 ESSENTIAL HYPERTENSION: ICD-10-CM

## 2018-04-17 DIAGNOSIS — L97.511 SKIN ULCER OF TOE OF RIGHT FOOT, LIMITED TO BREAKDOWN OF SKIN (H): ICD-10-CM

## 2018-04-17 DIAGNOSIS — N39.0 URINARY TRACT INFECTION WITHOUT HEMATURIA, SITE UNSPECIFIED: ICD-10-CM
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
## 2018-04-17 DIAGNOSIS — I73.9 PAD (PERIPHERAL ARTERY DISEASE) (H): ICD-10-CM

## 2018-04-17 DIAGNOSIS — S02.2XXD CLOSED FRACTURE OF NASAL BONE WITH ROUTINE HEALING: ICD-10-CM

## 2018-04-17 DIAGNOSIS — I70.1 RENAL ARTERY STENOSIS (H): ICD-10-CM

## 2018-04-17 DIAGNOSIS — I60.9 SUBARACHNOID HEMORRHAGE (H): ICD-10-CM

## 2018-04-17 DIAGNOSIS — N18.30 CKD (CHRONIC KIDNEY DISEASE) STAGE 3, GFR 30-59 ML/MIN (H): ICD-10-CM

## 2018-04-17 DIAGNOSIS — I62.00 SUBDURAL HEMORRHAGE (H): Primary | ICD-10-CM

## 2018-04-17 DIAGNOSIS — R53.81 PHYSICAL DECONDITIONING: ICD-10-CM

## 2018-04-17 PROCEDURE — 99316 NF DSCHRG MGMT 30 MIN+: CPT | Performed by: NURSE PRACTITIONER

## 2018-04-17 NOTE — PROGRESS NOTES
[facility] DISCHARGE SUMMARY    PATIENT'S NAME: Sweetie Thao  YOB: 1940  MEDICAL RECORD NUMBER:  [id number]    PRIMARY CARE PROVIDER AND CLINIC RESPONSIBLE AFTER TRANSFER: Devorah Aguilar / [place] [id number]    CODE STATUS/ADVANCE DIRECTIVES DISCUSSION:   CPR/Full code        Allergies   Allergen Reactions     Amitriptyline      Contrast Dye      Cymbalta      Dust Mites      Gabapentin      Levofloxacin      Mold      Nortriptyline        TRANSFERRING PROVIDERS: HUDSON Craig CNP, Alex Knapp MD  DATE OF SNF ADMISSION:  March / 30 / 2018  DATE OF SNF (anticipated) DISCHARGE: April / 19 / 2018  DISCHARGE DISPOSITION: Non-[facility] Provider   Nursing Facility: [facility]  stay 3/30/18 to 4/3/18.     Condition on Discharge:  Improving.  Cognitive Scores: SLUMS 25/30 and CPT 4.8/5.6    Equipment: walker    DISCHARGE DIAGNOSIS:   1. Subdural hemorrhage (H)    2. Subarachnoid hemorrhage (H)    3. Closed fracture of nasal bone with routine healing    4. Fall, subsequent encounter    5. PAD (peripheral artery disease) (H)    6. Skin ulcer of toe of right foot, limited to breakdown of skin (H)    7. Renal artery stenosis (H)    8. Essential hypertension    9. CKD (chronic kidney disease) stage 3, GFR 30-59 ml/min    10. Urinary tract infection without hematuria, site unspecified    11. Physical deconditioning        HPI Nursing Facility Course:  HPI information obtained from: facility chart records, facility staff, patient report and [facility] chart review.  She came to this facility for short term rehab and medical management following hospitalization for  facial trauma after falling outside on her way into [facility].  She missed a step and landed face down on the ground. CT head showed 3 mm subdural hematoma along the left tentorium, 2 mm subdural hematoma along the right aspect of  the anterior faix cerebri,  1 cm subarachnoid hemorrhage along the right superior frontal gyrus, small subarachnoid hemorrhage of the right frontal lobe and minimally displaced left nasal bone fracture. Neurosurgery consulted and Plavix is on hold until follow up CT in 2 weeks.   UA showed >100 WBC and positive leuk estrace. She has a history of recurrent UTI and ESBL. UC >100,000 enterococcus. She was treated with IV ampicillin and discharged on amoxicillin  She has worked with PHYSICAL THERAPY and OT with good results. Ambulating up to 500 ft with walker and supervision. TUG 25.81 seconds, indicating fair balance.  Requires stand by to min assist with bathing. Independent with toileting and dressing.   ASSESSMENT / PLAN:  (I62.00) Subdural hemorrhage (H)  (primary encounter diagnosis)  (I60.9) Subarachnoid hemorrhage (H)  (S02.2XXD) Closed fracture of nasal bone with routine healing  (W19.XXXD) Fall, subsequent encounter  Comment: facial and generalized pain slowly improving. Facial bruising much improved. Cognition is at baseline. She is feeling well and eager to return home.  Plan: discharge home 4/19/2018. Continue tylenol, oxycodone. Plavix remains on hold pending follow up CT head with Neurosurgery.     (I73.9) PAD (peripheral artery disease) (H)  (L97.511) Skin ulcer of toe of right foot, limited to breakdown of skin (H)  Comment: she's had an ulcer of her right great toe for over a year that appears unchanged. No signs of infection  Plan: wound care as below. She is followed by [facility] for in home wound care.     (I70.1) Renal artery stenosis (H)  (I10) Essential hypertension  Comment: controlled with BPs:  144/79, 103/62, 117/60   HR: 57-62  Plan: continue spironolactone, metoprolol, lisinopril, HCTZ, doxazosin.    (N18.3) CKD (chronic kidney disease) stage 3, GFR 30-59 ml/min  Comment: renal function at baseline  Plan: follow up labs per PCP. Avoid nephrotoxins.     (N39.0) Urinary tract infection  without hematuria, site unspecified  Comment: resolved with a course of amoxicillin  Plan: monitor for recurrent symptoms.     (R53.81) Physical deconditioning  Comment: progress in therapies as above  Plan: home therapies for ongoing strengthening, gait training and ADL safety      PAST MEDICAL HISTORY:  has a past medical history of Allergic rhinitis; Chronic renal insufficiency; Edema of both legs; Endometriosis; Essential hypertension; HDL lipoprotein deficiency; HTN (hypertension); Hypertriglyceridemia; Idiopathic neuropathy; Left knee DJD; Peripheral neuropathy; Pre-diabetes; Rash; Rectal polyp; Renal artery stenosis (H); and Toe ulcer, right (H).    DISCHARGE MEDICATIONS:  Current Outpatient Prescriptions   Medication Sig Dispense Refill     ACETAMINOPHEN PO Take 650 mg by mouth every 4 hours as needed for pain       ATORVASTATIN CALCIUM PO Take 80 mg by mouth daily       VITAMIN D, CHOLECALCIFEROL, PO Take 1,000 Units by mouth daily       Cranberry 450 MG CAPS Take 1 tablet by mouth daily       LACTOBACILLUS RHAMNOSUS, GG, PO Take 1 capsule by mouth daily       DOXAZOSIN MESYLATE PO Take 4 mg by mouth daily       HYDROCHLOROTHIAZIDE PO Take 25 mg by mouth daily       LISINOPRIL PO Take 20 mg by mouth 2 times daily       METOPROLOL TARTRATE PO Take 50 mg by mouth 2 times daily       multivitamin, therapeutic with minerals (MULTI-VITAMIN) TABS tablet Take 1 tablet by mouth daily       OMEGA 3-6-9 FATTY ACIDS PO Take 1 capsule by mouth daily       OXYCODONE HCL PO Take 5 mg by mouth every 4 hours as needed       PREGABALIN PO Take 75 mg by mouth 2 times daily       SPIRONOLACTONE PO Take 25 mg by mouth daily       vitamin B complex with vitamin C (VITAMIN  B COMPLEX) TABS tablet Take 1 tablet by mouth daily       Zinc 50 MG CAPS Take 50 mg by mouth daily         MEDICATION CHANGES/RATIONALE:   Amoxicillin completed 4/13/2018  Aleve discontinued upon admission  Controlled medications sent with patient:  "  Medication: oxycodone , 30 tabs given to patient at the time of discharge to take home     ROS:    10 point ROS of systems including Constitutional, Eyes, Respiratory, Cardiovascular, Gastroenterology, Genitourinary, Integumentary, Muscularskeletal, Psychiatric were all negative except for pertinent positives noted in my HPI.    Physical Exam:   Vitals: /79  Pulse 57  Temp 96.2  F (35.7  C)  Resp 16  Ht 5' 8\" (1.727 m)  Wt 168 lb 9.6 oz (76.5 kg)  SpO2 96%  BMI 25.64 kg/m2  BMI= Body mass index is 25.64 kg/(m^2).    GENERAL APPEARANCE:  Alert, in no distress  ENT:  Mouth and posterior oropharynx normal, moist mucous membranes, normal hearing acuity  EYES:  EOM normal, conjunctiva and lids normal  NECK:  No adenopathy,masses or thyromegaly  RESP:  respiratory effort and palpation of chest normal, lungs clear to auscultation , no respiratory distress  CV:  Palpation and auscultation of heart done , regular rate and rhythm, no murmur,  no edema, +2 pedal pulses  ABDOMEN: soft, nontender, no hepatosplenomegaly or other masses  M/S:   Gait steady with walker. BEACH with good strength. No joint inflammation  SKIN:  Fading periorbital and  facial ecchymosis. No rashes. Ulcer over bony prominence right great toe, clean, dry, no erythema.   PSYCH:  oriented X 3, normal insight, judgement and memory, affect and mood normal    DISCHARGE PLAN:  Occupational Therapy, Physical Therapy, Registered Nurse and From:  [facility]  Patient instructed to follow-up with:  PCP in 7 days    Neurosurgery follow up with CT head 4/24/2018  Cardiology follow up per usual routine.     Current [facility] scheduled appointments:  No future appointments.    MTM referral needed and placed by this provider: No    Pending labs: None  SNF labs   Last Basic Metabolic Panel:  Lab Results   Component Value Date     04/12/2018      Lab Results   Component Value Date    POTASSIUM 3.8 04/12/2018     Lab Results   Component Value Date "    CHLORIDE 103 04/12/2018     Lab Results   Component Value Date    ROHIT 8.6 04/12/2018     Lab Results   Component Value Date    CO2 28 04/12/2018     Lab Results   Component Value Date    BUN 37 04/12/2018     Lab Results   Component Value Date    CR 1.12 04/12/2018     Lab Results   Component Value Date     04/12/2018     Lab Results   Component Value Date    HGB 12.5 04/12/2018       Discharge Treatments: Daily and prn wound care to chronic ulcer right great toe: cleanse, pat dry, apply thin layer of silvadene cream and cover with dry dressing.     TOTAL DISCHARGE TIME:   Greater than 30 minutes  Electronically signed by:  HUDSON Craig CNP

## 2021-05-27 ENCOUNTER — RECORDS - HEALTHEAST (OUTPATIENT)
Dept: ADMINISTRATIVE | Facility: CLINIC | Age: 81
End: 2021-05-27

## 2021-10-14 ENCOUNTER — LAB REQUISITION (OUTPATIENT)
Dept: LAB | Facility: CLINIC | Age: 81
End: 2021-10-14

## 2021-10-14 VITALS
RESPIRATION RATE: 16 BRPM | WEIGHT: 168.6 LBS | DIASTOLIC BLOOD PRESSURE: 76 MMHG | OXYGEN SATURATION: 99 % | TEMPERATURE: 98.5 F | BODY MASS INDEX: 25.64 KG/M2 | HEART RATE: 59 BPM | SYSTOLIC BLOOD PRESSURE: 129 MMHG

## 2021-10-14 DIAGNOSIS — Z00.01 ENCOUNTER FOR GENERAL ADULT MEDICAL EXAMINATION WITH ABNORMAL FINDINGS: ICD-10-CM

## 2021-10-14 PROCEDURE — U0003 INFECTIOUS AGENT DETECTION BY NUCLEIC ACID (DNA OR RNA); SEVERE ACUTE RESPIRATORY SYNDROME CORONAVIRUS 2 (SARS-COV-2) (CORONAVIRUS DISEASE [COVID-19]), AMPLIFIED PROBE TECHNIQUE, MAKING USE OF HIGH THROUGHPUT TECHNOLOGIES AS DESCRIBED BY CMS-2020-01-R: HCPCS | Performed by: NURSE PRACTITIONER

## 2021-10-14 RX ORDER — SENNOSIDES 8.6 MG
1 TABLET ORAL 2 TIMES DAILY
COMMUNITY
End: 2023-02-07

## 2021-10-14 RX ORDER — AMLODIPINE BESYLATE 10 MG/1
10 TABLET ORAL DAILY
COMMUNITY
End: 2021-10-28

## 2021-10-14 RX ORDER — CLOPIDOGREL BISULFATE 75 MG/1
75 TABLET ORAL DAILY
COMMUNITY

## 2021-10-14 RX ORDER — ASPIRIN 81 MG/1
81 TABLET ORAL DAILY
COMMUNITY

## 2021-10-14 RX ORDER — LOSARTAN POTASSIUM 100 MG/1
100 TABLET ORAL DAILY
COMMUNITY
End: 2021-10-28

## 2021-10-14 NOTE — PROGRESS NOTES
[facility] GERIATRIC SERVICES    PRIMARY CARE PROVIDER AND CLINIC:  Devorah Aguilar, [facility] [address] / [place] [address]  Chief Complaint   Patient presents with     Hospital F/U      [place] Medical Record Number:  [id number]  Place of Service where encounter took place:  [facility] (TCU) [[id number]]    Sweetie Thao  is a 81 year old  (1940), admitted to the above facility from  [facility] . Hospital stay 10/11/21 through 10/14/21.  HPI:    82 yo female PMH knee OA, PAD (hx right lower extremity angiography with balloon angioplasty of SFA by Dr. Bai in 2017)  and HLD (on statin), CKD3 and hx bilateral renal artery stenosis now s/p planned left TKA by Dr Ybarra. WBAT, DVT prophylaxis with ASA and Plavix. HTN managed with Norvasc, HCTZ, losartan and metoprolol. To TCU for therapies.     Today reports pain not well managed. Due to delirium with narcotic use these meds were stopped, remains on Tylenol alone. Discussed adding lidocaine patches, atarax and using ice for symptoms management. Denies headaches - does get dizzy at times when first up from bed. No dyspnea, bowel or bladder concerns. Reviewed code status - Full Code desired. Since admission BP range 129-179/74-80, suspect up due to pain. HR 60-70s and sats 96%.     CODE STATUS/ADVANCE DIRECTIVES DISCUSSION:  Full Code    ALLERGIES:   Allergies   Allergen Reactions     Amitriptyline      Contrast Dye      Cymbalta      Dust Mites      Gabapentin      Levofloxacin      Mold      Nortriptyline       PAST MEDICAL HISTORY:   Past Medical History:   Diagnosis Date     Allergic rhinitis      Chronic renal insufficiency      Edema of both legs      Endometriosis      Essential hypertension      HDL lipoprotein deficiency      HTN (hypertension)      Hypertriglyceridemia      Idiopathic neuropathy      Left knee DJD      Peripheral neuropathy      Pre-diabetes      Rash      Rectal polyp      Renal artery stenosis (H)       Toe ulcer, right (H)       PAST SURGICAL HISTORY:   has no past surgical history on file.  FAMILY HISTORY: family history includes Prostate Cancer in her father.  SOCIAL HISTORY:     Patient's living condition: lives alone    Post Discharge Medication Reconciliation Status: discharge medications reconciled and changed, per note/orders  Current Outpatient Medications   Medication Sig     ACETAMINOPHEN PO Take 1,000 mg by mouth every 6 hours      amLODIPine (NORVASC) 10 MG tablet Take 10 mg by mouth daily     aspirin 81 MG EC tablet Take 81 mg by mouth daily     ATORVASTATIN CALCIUM PO Take 80 mg by mouth daily     clopidogrel (PLAVIX) 75 MG tablet Take 75 mg by mouth daily     HYDROCHLOROTHIAZIDE PO Take 25 mg by mouth daily     hydrOXYzine (ATARAX) 10 MG tablet Take 10 mg by mouth 3 times daily     LACTOBACILLUS RHAMNOSUS, GG, PO Take 1 capsule by mouth daily     Lidocaine (LIDOCARE) 4 % Patch Place 2 patches onto the skin every 24 hours To prevent lidocaine toxicity, patient should be patch free for 12 hrs daily.     losartan (COZAAR) 100 MG tablet Take 100 mg by mouth daily     METOPROLOL TARTRATE PO Take 50 mg by mouth 2 times daily     PREGABALIN PO Take 75 mg by mouth 2 times daily     sennosides (SENOKOT) 8.6 MG tablet Take 1 tablet by mouth 2 times daily      VITAMIN D, CHOLECALCIFEROL, PO Take 1,000 Units by mouth daily     No current facility-administered medications for this visit.       ROS:  10 point ROS of systems including Constitutional, Eyes, Respiratory, Cardiovascular, Gastroenterology, Genitourinary, Integumentary, Musculoskeletal, Psychiatric were all negative except for pertinent positives noted in my HPI.    Vitals:  /76   Pulse 59   Temp 98.5  F (36.9  C)   Resp 16   Wt 76.5 kg (168 lb 9.6 oz)   SpO2 99%   BMI 25.64 kg/m    Exam:  GENERAL APPEARANCE:  Alert, in no distress, pleasant, cooperative, oriented x 4  EYES:  EOM, lids, pupils and irises normal, sclera clear and  conjunctiva normal, no discharge or mattering on lids or lashes noted  ENT:  Mouth normal, moist mucous membranes, nose normal without drainage or crusting, external ears without lesions, hearing acuity intact  NECK: supple, symmetrical, trachea midline  RESP:  respiratory effort normal, no chest wall tenderness, no respiratory distress, Lung sounds clear, patient is on room air  CV:  Auscultation of heart done, rate and rhythm controlled and regular, no murmur, no rub or gallop. Edema none bilateral lower extremities.   ABDOMEN:  normal bowel sounds, soft, nontender, no palpable masses.  M/S:   Gait and station not assessed, no tenderness or swelling of the joints; able to move all extremities, digits normal  SKIN:  Inspection and palpation of skin and subcutaneous tissue: left knee incision covered with a negative pressure dressing, intact, trace bloody drainage noted on dressing  NEURO: cranial nerves 2-12 grossly intact, no facial asymmetry, no speech deficits and able to follow directions, moves all extremities symmetrically  PSYCH:  insight and judgement and memory intact, affect and mood normal     Lab/Diagnostic data:  10/14/21: Cr 0.82, K 4.1, Hgb 10.6    ASSESSMENT/PLAN:  Osteoarthritis of left knee, unspecified osteoarthritis type  Aftercare following left knee joint replacement surgery  Physical deconditioning  Chronic OA, now s/p knee replacement. Not tolerating narcotics. Continue tylenol 1000 mg every 6 hrs, add atarax 10 mg TID and lidocaine 4% patches one to each side of incision daily, vit d 1000 units daily. Staff to update provider if not effective. Therapies as ordered - follow-up with progress next visit. Ortho f/u as planned.     Delirium  Noted in hospital associated with pain med use - pain meds stopped, mentation appears cleared. Monitor.     Hypertension, unspecified type  Peripheral vascular disease (H)  Hyperlipidemia, unspecified hyperlipidemia type  Chronic. BPs elevated due to pain.  Continue Norvasc 10 mg daily, asa 81 mg daily, Lipitor 80 mg daily, Plavix 75 mg daily, hydrochlorothiazide 25 mg daily, losartan 100 mg daily, metoprolol 50 mg BID, monitor vs and review ranges next visit.     Stage 3 chronic kidney disease, unspecified whether stage 3a or 3b CKD (H)  Bilateral renal artery stenosis (H)  Chronic issues, stable. Avoid nephrotoxins. Check BMP next week.     Slow transit constipation  Acute with surgery. At this time change senna to 1 tab PO BID. Staff to update provider if not effective.     Acute posthemorrhagic anemia  Due to surgery. CBC check next week.     Advance directive discussed with patient  Reviewed- Full Code.     Orders:  1. Full Code  2. Change senna s to 1 tab PO BID diagnosis constipation  3. Lidocaine patches 4% apply a patch to each side of left knee incision on in AM off at HS diagnosis pan  4. Atarax 10 mg PO TID diagnosis pain  5. CBC. BMP on 10/19    Total unit/floor time of 38 minutes spent consisted of the following: examination of patient, reviewing the record including pertinent labs and imaging. More than 50% of this time was spent in coordination of care with the patient, nursing staff, and other healthcare providers. This time was spent on discussing the care plan including labs, discharge/safe placement, and specialty follow up. Additional specific discussion included discussion of pain management, use of laxatives, review of code status, review of expected TCU course and discharge planning for a total of 20 minutes.    Electronically signed by:  HUDSON Rivers CNP

## 2021-10-15 ENCOUNTER — TRANSITIONAL CARE UNIT VISIT (OUTPATIENT)
Dept: GERIATRICS | Facility: CLINIC | Age: 81
End: 2021-10-15
Payer: COMMERCIAL

## 2021-10-15 DIAGNOSIS — N18.30 STAGE 3 CHRONIC KIDNEY DISEASE, UNSPECIFIED WHETHER STAGE 3A OR 3B CKD (H): ICD-10-CM

## 2021-10-15 DIAGNOSIS — E78.5 HYPERLIPIDEMIA, UNSPECIFIED HYPERLIPIDEMIA TYPE: ICD-10-CM

## 2021-10-15 DIAGNOSIS — Z47.1 AFTERCARE FOLLOWING LEFT KNEE JOINT REPLACEMENT SURGERY: ICD-10-CM

## 2021-10-15 DIAGNOSIS — R41.0 DELIRIUM: ICD-10-CM

## 2021-10-15 DIAGNOSIS — Z71.89 ADVANCE DIRECTIVE DISCUSSED WITH PATIENT: ICD-10-CM

## 2021-10-15 DIAGNOSIS — I70.1 BILATERAL RENAL ARTERY STENOSIS (H): ICD-10-CM

## 2021-10-15 DIAGNOSIS — I10 HYPERTENSION, UNSPECIFIED TYPE: ICD-10-CM

## 2021-10-15 DIAGNOSIS — K59.01 SLOW TRANSIT CONSTIPATION: ICD-10-CM

## 2021-10-15 DIAGNOSIS — Z96.652 AFTERCARE FOLLOWING LEFT KNEE JOINT REPLACEMENT SURGERY: ICD-10-CM

## 2021-10-15 DIAGNOSIS — R53.81 PHYSICAL DECONDITIONING: ICD-10-CM

## 2021-10-15 DIAGNOSIS — I73.9 PERIPHERAL VASCULAR DISEASE (H): ICD-10-CM

## 2021-10-15 DIAGNOSIS — M17.12 OSTEOARTHRITIS OF LEFT KNEE, UNSPECIFIED OSTEOARTHRITIS TYPE: Primary | ICD-10-CM

## 2021-10-15 DIAGNOSIS — D62 ACUTE POSTHEMORRHAGIC ANEMIA: ICD-10-CM

## 2021-10-15 LAB — SARS-COV-2 RNA RESP QL NAA+PROBE: NEGATIVE

## 2021-10-15 PROCEDURE — 36415 COLL VENOUS BLD VENIPUNCTURE: CPT | Performed by: NURSE PRACTITIONER

## 2021-10-15 PROCEDURE — 86481 TB AG RESPONSE T-CELL SUSP: CPT | Performed by: NURSE PRACTITIONER

## 2021-10-15 PROCEDURE — 99310 SBSQ NF CARE HIGH MDM 45: CPT | Performed by: NURSE PRACTITIONER

## 2021-10-15 PROCEDURE — P9603 ONE-WAY ALLOW PRORATED MILES: HCPCS | Performed by: NURSE PRACTITIONER

## 2021-10-15 RX ORDER — LIDOCAINE 4 G/G
2 PATCH TOPICAL EVERY 24 HOURS
COMMUNITY
End: 2023-02-07

## 2021-10-15 RX ORDER — HYDROXYZINE HYDROCHLORIDE 10 MG/1
10 TABLET, FILM COATED ORAL
COMMUNITY
End: 2021-10-23

## 2021-10-17 LAB
GAMMA INTERFERON BACKGROUND BLD IA-ACNC: 0.05 IU/ML
M TB IFN-G BLD-IMP: ABNORMAL
M TB IFN-G CD4+ BCKGRND COR BLD-ACNC: -0.02 IU/ML
MITOGEN IGNF BCKGRD COR BLD-ACNC: -0.02 IU/ML
MITOGEN IGNF BCKGRD COR BLD-ACNC: -0.02 IU/ML
QUANTIFERON MITOGEN: 0.03 IU/ML
QUANTIFERON NIL TUBE: 0.05 IU/ML
QUANTIFERON TB1 TUBE: 0.03 IU/ML
QUANTIFERON TB2 TUBE: 0.03

## 2021-10-18 ENCOUNTER — LAB REQUISITION (OUTPATIENT)
Dept: LAB | Facility: CLINIC | Age: 81
End: 2021-10-18

## 2021-10-18 DIAGNOSIS — N18.9 CHRONIC KIDNEY DISEASE, UNSPECIFIED: ICD-10-CM

## 2021-10-18 DIAGNOSIS — D64.9 ANEMIA, UNSPECIFIED: ICD-10-CM

## 2021-10-18 NOTE — PROGRESS NOTES
[place] GERIATRIC SERVICES  INITIAL VISIT NOTE  October 19, 2021    PRIMARY CARE PROVIDER AND CLINIC:  Devorah Aguilar [address] / [place] [address]    CHIEF COMPLAINT:  Hospital follow-up/Initial visit    HPI:    Sweetie Thao is a 81 year old  (1940) female who was seen at [place] [facility] on October 19, 2021 for an initial visit.     Medical history is notable for hypertension, dyslipidemia, PAD, peripheral neuropathy, CKD stage II-IIIa, prediabetes, traumatic SDH/SAH, UTI, pneumonia, endometriosis, allergic rhinitis, and osteoarthritis.    Summary of hospital course:  Patient was hospitalized at [facility] from October 11 through October 14, 2021 for planned surgical treatment of left knee osteoarthritis.  She underwent left total knee arthroplasty on October 11 by Dr. Dirk Ybarra.  Postop hemoglobin dropped to 10.6 on April 14.  Hospital course was complicated by delirium associated with narcotic use which resolved.  TCU was recommended per therapies.    Patient is admitted to this facility for medical management, nursing care, and rehab.     Of note, history was obtained from patient, facility RN, and extensive review of the chart necessitated by complex hospitalization.    No hospital discharge summary is yet available.    Today's visit:  Patient was seen in her room, while lying in bed.  She appears comfortable.  She continues to have moderate to severe left knee pain however it is slowly improving and today her pain is rated at 8 out of 10.  She reports that she had a bowel movement last night.  She denies fever, chills, chest pain, palpitation, dyspnea, nausea, vomiting, abdominal pain, or urinary symptoms.  Her delirium has resolved.      CODE STATUS:   CPR/Full code     PAST MEDICAL HISTORY:   Hypertension  Dyslipidemia  PAD, s/p right SFA balloon angioplasty in 2017  Bilateral renal artery stenosis  Peripheral neuropathy  Mildly dilated  ascending aorta, measuring 4.3 cm, per echo in February 2018  Right 3rd  toe distal phalanx osteomyelitis  CKD stage II-IIIa  Prediabetes  SDH/SAH(traumatic after a fall) in March 2018  UTI due to ESBL producing E. coli and Enterococcus faecalis  Pneumonia  Endometriosis  Rectal polyp  Allergic rhinitis  Osteoarthritis      Past Medical History:   Diagnosis Date     Allergic rhinitis      Chronic renal insufficiency      Edema of both legs      Endometriosis      Essential hypertension      HDL lipoprotein deficiency      HTN (hypertension)      Hypertriglyceridemia      Idiopathic neuropathy      Left knee DJD      Peripheral neuropathy      Pre-diabetes      Rash      Rectal polyp      Renal artery stenosis (H)      Toe ulcer, right (H)        PAST SURGICAL HISTORY:   Left total knee arthroplasty  Hysterectomy  Urethral reconstruction  Right foot surgery x2    FAMILY HISTORY:   Family History   Problem Relation Age of Onset     Prostate Cancer Father        SOCIAL HISTORY:  Patient is a never smoker.  She does not drink alcohol.    MEDICATIONS:  Current Outpatient Medications   Medication Sig Dispense Refill     ACETAMINOPHEN PO Take 1,000 mg by mouth every 6 hours        amLODIPine (NORVASC) 10 MG tablet Take 10 mg by mouth daily       aspirin 81 MG EC tablet Take 81 mg by mouth daily       ATORVASTATIN CALCIUM PO Take 80 mg by mouth daily       clopidogrel (PLAVIX) 75 MG tablet Take 75 mg by mouth daily       HYDROCHLOROTHIAZIDE PO Take 25 mg by mouth daily       hydrOXYzine (ATARAX) 10 MG tablet Take 10 mg by mouth 3 times daily       LACTOBACILLUS RHAMNOSUS, GG, PO Take 1 capsule by mouth daily       Lidocaine (LIDOCARE) 4 % Patch Place 2 patches onto the skin every 24 hours To prevent lidocaine toxicity, patient should be patch free for 12 hrs daily.       losartan (COZAAR) 100 MG tablet Take 100 mg by mouth daily       METOPROLOL TARTRATE PO Take 50 mg by mouth 2 times daily       PREGABALIN PO Take 75 mg by  "mouth 2 times daily       sennosides (SENOKOT) 8.6 MG tablet Take 1 tablet by mouth 2 times daily        VITAMIN D, CHOLECALCIFEROL, PO Take 1,000 Units by mouth daily         ALLERGIES:  Allergies   Allergen Reactions     Amitriptyline      Contrast Dye      Cymbalta      Dust Mites      Gabapentin      Levofloxacin      Mold      Nortriptyline        ROS:  10 point ROS were negative other than the symptoms noted above in the HPI.    PHYSICAL EXAM:  Vital signs were reviewed in the chart.  Vital Signs: BP (!) 143/73   Pulse 67   Temp 96.9  F (36.1  C)   Resp 18   Ht 1.727 m (5' 8\")   Wt 86.5 kg (190 lb 9.6 oz)   SpO2 95%   BMI 28.98 kg/m    General: Comfortable and in no acute distress  HEENT: Normocephalic; oropharynx clear, mild conjunctival pallor  Cardiovascular: Normal S1, S2, RRR  Respiratory: Lungs clear to auscultation bilaterally  GI: Abdomen soft, non-tender, non-distended, +BS  Extremities: No LE edema  Neuro: CX II-XII grossly intact; ROM in all four extremities grossly intact  Psych: Alert and oriented x3; normal affect  Skin: Left knee surgical incision is dressed and appears clean    LABORATORY/IMAGING DATA:  Hematology profile (October 14, 2021): Hemoglobin 10.6, MCV 90  Chemistry profile (October 14, 2021): Creatinine 0.82      ASSESSMENT/PLAN:  Left knee osteoarthritis, s/p left total knee arthroplasty on October 11, 2021,  Physical deconditioning.  Patient is hemodynamically stable.  Patient has moderate to severe surgical pain but overall it is slowly improving.  Plan:  Continue pain management with acetaminophen and lidocaine patch  Avoid narcotics as much as possible due to delirium  Continue DVT prophylaxis with aspirin 81 mg p.o. daily  WBAT  Continue PT/OT evaluation and therapy  Follow-up with Ortho as instructed    Acute blood loss anemia.  Postop hemoglobin dropped to 10.6 on October 14 from previous normal level of 13.9 in September 2021, due to blood loss of " surgery.  Plan:  Monitor hemoglobin periodically  Follow-up on hemoglobin from today, October 19    Delirium.  Noted in the hospital associated with narcotic use.  Resolved.  Plan:  Continue standard delirium precautions  Avoid narcotics as much as possible  Monitor mental status    Essential hypertension.  Blood pressure is well controlled.  Plan:  Continue PTA amlodipine 10 mg p.o. daily, metoprolol 50 mg p.o. twice daily, hydrochlorothiazide 25 mg p.o. daily, and losartan 100 mg p.o. daily  Monitor blood pressure    PAD, s/p right SFA balloon angioplasty in 2017,  Bilateral renal artery stenosis,  Peripheral neuropathy,  Dyslipidemia.  Stable.  Plan:  Continue aspirin 81 mg p.o. daily, Plavix 75 mg p.o. daily, and atorvastatin 80 mg p.o. at bedtime  Continue pregabalin 75 mg p.o. twice daily  Follow-up as outpatient    CKD stage II-IIIa.  Baseline creatinine 0.9-1.1  Last creatinine was stable at 0.81 on October 14.  Plan:  Avoid NSAIDs and nephrotoxins  Monitor renal function periodically  Follow-up on BMP from today, October 19    History of prediabetes.  It has resolved.  Last hemoglobin A1c was 4.8% in June 2018.  Plan:  Monitor blood glucose as needed    Slow transit constipation.  Plan:  Continue the bowel regimen        Orders written by provider at facility:  None.        Recommendation by provider at facility:  Follow-up on CBC and BMP from today, October 19      Addendum:  Today's labs show hemoglobin of 11, potassium of 3.2, and creatinine of 0.7.  Plan:  Telephone order placed for potassium chloride 40 mEq p.o. x1 now and again in the morning.      Electronically signed by:  Geronimo Glover MD

## 2021-10-19 ENCOUNTER — TRANSITIONAL CARE UNIT VISIT (OUTPATIENT)
Dept: GERIATRICS | Facility: CLINIC | Age: 81
End: 2021-10-19
Payer: COMMERCIAL

## 2021-10-19 VITALS
WEIGHT: 190.6 LBS | OXYGEN SATURATION: 95 % | DIASTOLIC BLOOD PRESSURE: 73 MMHG | RESPIRATION RATE: 18 BRPM | SYSTOLIC BLOOD PRESSURE: 143 MMHG | TEMPERATURE: 96.9 F | HEART RATE: 67 BPM | HEIGHT: 68 IN | BODY MASS INDEX: 28.89 KG/M2

## 2021-10-19 DIAGNOSIS — Z96.652 AFTERCARE FOLLOWING LEFT KNEE JOINT REPLACEMENT SURGERY: Primary | ICD-10-CM

## 2021-10-19 DIAGNOSIS — D62 ACUTE BLOOD LOSS ANEMIA: ICD-10-CM

## 2021-10-19 DIAGNOSIS — Z47.1 AFTERCARE FOLLOWING LEFT KNEE JOINT REPLACEMENT SURGERY: Primary | ICD-10-CM

## 2021-10-19 DIAGNOSIS — K59.01 SLOW TRANSIT CONSTIPATION: ICD-10-CM

## 2021-10-19 DIAGNOSIS — N18.31 STAGE 3A CHRONIC KIDNEY DISEASE (H): ICD-10-CM

## 2021-10-19 DIAGNOSIS — I73.9 PAD (PERIPHERAL ARTERY DISEASE) (H): ICD-10-CM

## 2021-10-19 DIAGNOSIS — E78.5 DYSLIPIDEMIA: ICD-10-CM

## 2021-10-19 DIAGNOSIS — R41.0 DELIRIUM: ICD-10-CM

## 2021-10-19 DIAGNOSIS — R53.81 PHYSICAL DECONDITIONING: ICD-10-CM

## 2021-10-19 DIAGNOSIS — I10 ESSENTIAL HYPERTENSION: ICD-10-CM

## 2021-10-19 LAB
ANION GAP SERPL CALCULATED.3IONS-SCNC: 5 MMOL/L (ref 3–14)
BUN SERPL-MCNC: 22 MG/DL (ref 7–30)
CALCIUM SERPL-MCNC: 8.6 MG/DL (ref 8.5–10.1)
CHLORIDE BLD-SCNC: 107 MMOL/L (ref 94–109)
CO2 SERPL-SCNC: 28 MMOL/L (ref 20–32)
CREAT SERPL-MCNC: 0.87 MG/DL (ref 0.52–1.04)
ERYTHROCYTE [DISTWIDTH] IN BLOOD BY AUTOMATED COUNT: 13.9 % (ref 10–15)
GFR SERPL CREATININE-BSD FRML MDRD: 64 ML/MIN/1.73M2
GLUCOSE BLD-MCNC: 83 MG/DL (ref 70–99)
HCT VFR BLD AUTO: 33.1 % (ref 35–47)
HGB BLD-MCNC: 11 G/DL (ref 11.7–15.7)
MCH RBC QN AUTO: 30.1 PG (ref 26.5–33)
MCHC RBC AUTO-ENTMCNC: 33.2 G/DL (ref 31.5–36.5)
MCV RBC AUTO: 90 FL (ref 78–100)
PLATELET # BLD AUTO: 229 10E3/UL (ref 150–450)
POTASSIUM BLD-SCNC: 3.2 MMOL/L (ref 3.4–5.3)
RBC # BLD AUTO: 3.66 10E6/UL (ref 3.8–5.2)
SODIUM SERPL-SCNC: 140 MMOL/L (ref 133–144)
WBC # BLD AUTO: 7.4 10E3/UL (ref 4–11)

## 2021-10-19 PROCEDURE — 85014 HEMATOCRIT: CPT | Performed by: NURSE PRACTITIONER

## 2021-10-19 PROCEDURE — 80048 BASIC METABOLIC PNL TOTAL CA: CPT | Performed by: NURSE PRACTITIONER

## 2021-10-19 PROCEDURE — 36415 COLL VENOUS BLD VENIPUNCTURE: CPT | Performed by: NURSE PRACTITIONER

## 2021-10-19 PROCEDURE — P9603 ONE-WAY ALLOW PRORATED MILES: HCPCS | Performed by: NURSE PRACTITIONER

## 2021-10-19 PROCEDURE — 99305 1ST NF CARE MODERATE MDM 35: CPT | Performed by: INTERNAL MEDICINE

## 2021-10-19 ASSESSMENT — MIFFLIN-ST. JEOR: SCORE: 1388.06

## 2021-10-23 ENCOUNTER — TELEPHONE (OUTPATIENT)
Dept: GERIATRICS | Facility: CLINIC | Age: 81
End: 2021-10-23

## 2021-10-23 DIAGNOSIS — R52 PAIN: ICD-10-CM

## 2021-10-23 DIAGNOSIS — Z47.1 AFTERCARE FOLLOWING LEFT KNEE JOINT REPLACEMENT SURGERY: Primary | ICD-10-CM

## 2021-10-23 DIAGNOSIS — Z96.652 AFTERCARE FOLLOWING LEFT KNEE JOINT REPLACEMENT SURGERY: Primary | ICD-10-CM

## 2021-10-23 RX ORDER — HYDROXYZINE HYDROCHLORIDE 25 MG/1
25 TABLET, FILM COATED ORAL EVERY 6 HOURS
COMMUNITY
Start: 2021-10-23 | End: 2021-10-29

## 2021-10-23 RX ORDER — HYDROMORPHONE HYDROCHLORIDE 2 MG/1
TABLET ORAL
Qty: 10 TABLET | Refills: 0 | Status: SHIPPED | OUTPATIENT
Start: 2021-10-23 | End: 2021-10-23

## 2021-10-23 RX ORDER — PREGABALIN 100 MG/1
100 CAPSULE ORAL 2 TIMES DAILY
Qty: 15 CAPSULE | Refills: 0 | Status: SHIPPED | OUTPATIENT
Start: 2021-10-23 | End: 2021-10-28

## 2021-10-23 RX ORDER — HYDROMORPHONE HYDROCHLORIDE 2 MG/1
TABLET ORAL
Qty: 10 TABLET | Refills: 0 | Status: SHIPPED | OUTPATIENT
Start: 2021-10-23 | End: 2021-10-29

## 2021-10-23 NOTE — TELEPHONE ENCOUNTER
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 She has been taking the scheduled medications without much relief. She was taking oxycodone while in the hospital and it causes hallucinations. Her kidneys will likely clear hydromorphone easier than the oxycodone. Reasonable to try low doses for now.     ASSESSMENT/PLAN    Increase Lyrica to 100 mg BID    Hydromorphone 1-2 mg very 4 hours PRN    If pain remains uncontrolled and or has side effects from the hydromorphone it would be reasonable to start robaxin 500 mg QID PRN    Electronically signed by:   Madina Tolliver, MICHEL

## 2021-10-27 VITALS
RESPIRATION RATE: 18 BRPM | SYSTOLIC BLOOD PRESSURE: 118 MMHG | WEIGHT: 188 LBS | BODY MASS INDEX: 28.59 KG/M2 | DIASTOLIC BLOOD PRESSURE: 69 MMHG | OXYGEN SATURATION: 93 % | HEART RATE: 62 BPM | TEMPERATURE: 97.1 F

## 2021-10-27 NOTE — PROGRESS NOTES
[facility] GERIATRIC SERVICES DISCHARGE SUMMARY  PATIENT'S NAME: Sweetie Thao  YOB: 1940  MEDICAL RECORD NUMBER:  [id number]  Place of Service where encounter took place:  [facility] (TCU) [[id number]]    PRIMARY CARE PROVIDER AND CLINIC RESPONSIBLE AFTER TRANSFER:   VERONA Stone [address] / [place] [address]    Non-[facility] Provider     Transferring providers: HUDSON Rivers CNP, Dr. Adalberto MD  Recent Hospitalization/ED:  [facility]  stay 10/11/21 to 10/14/21.  Date of SNF Admission: 10/14/21  Date of SNF (anticipated) Discharge: 10/30/21  Discharged to: previous independent home  Cognitive Scores: SLUMS: 26/30  Physical Function: Ambulating 400 ft with 4WW  DME: No new DME needed    CODE STATUS/ADVANCE DIRECTIVES DISCUSSION:  Full Code   ALLERGIES: Amitriptyline, Contrast dye, Cymbalta, Dust mites, Gabapentin, Levofloxacin, Mold, and Nortriptyline    NURSING FACILITY COURSE   Medication Changes/Rationale:     Stop hydromorphone    Increased Lyrica to 100 mg BID    Added Celebrex 200 mg daily x 10 days after discharge    Stopped hydroxyzine    Per recent TCU provider progress notes:  82 yo female PMH knee OA, PAD (hx right lower extremity angiography with balloon angioplasty of SFA by Dr. Bai in 2017)  and HLD (on statin), CKD3 and hx bilateral renal artery stenosis now s/p planned left TKA by Dr Ybarra. WBAT, DVT prophylaxis with ASA and Plavix. HTN managed with Norvasc, HCTZ, losartan and metoprolol. To TCU for therapies.     Seen today for discharge visit. Doing well, minimal pain - will stop dilaudid and vistaril. No other new complaints. Weight down 2 lbs since discharge. BP range 115-132/66-78 and sats 94% room air. To discharge home as planned with home care as noted below.     Summary of nursing facility stay:   Left knee osteoarthritis, s/p left total knee arthroplasty  Physical deconditioning  Doing well. Continue pain  management with acetaminophen and lidocaine patch as well as short term Celebrex. Stop dilaudid and Vistaril. Continue DVT prophylaxis with aspirin 81 mg p.o. daily, WBAT. Ortho f/u as planned and home with home care as below.     Acute blood loss anemia  Postop hemoglobin dropped to 10.6. Monitor hemoglobin periodically.     Delirium  Resolved. Monitor mental status.     Essential hypertension  Blood pressure is well controlled. Continue PTA amlodipine 10 mg p.o. daily, metoprolol 50 mg p.o. twice daily, hydrochlorothiazide 25 mg p.o. daily, and losartan 100 mg p.o. daily. Monitor vs per PCP routine.     PAD, s/p right SFA balloon angioplasty  Bilateral renal artery stenosis  Peripheral neuropathy  Dyslipidemia  Stable. Continue aspirin 81 mg p.o. daily, Plavix 75 mg p.o. daily, and atorvastatin 80 mg p.o. at bedtime. Continue pregabalin at increased dose of 100 p.o. twice daily. Follow-up as outpatient    CKD stage II-IIIa  Baseline creatinine 0.9-1.1. Last creatinine was stable at 0.81 on October 14. Avoid NSAIDs and nephrotoxins and monitor renal function periodically    Slow transit constipation  No complaints. Continue the bowel regimen.     Discharge Medications:  Current Outpatient Medications   Medication Sig Dispense Refill     ACETAMINOPHEN PO Take 1,000 mg by mouth every 6 hours        amLODIPine (NORVASC) 10 MG tablet Take 1 tablet (10 mg) by mouth daily 30 tablet 0     aspirin 81 MG EC tablet Take 81 mg by mouth daily       ATORVASTATIN CALCIUM PO Take 80 mg by mouth daily       celecoxib (CELEBREX) 200 MG capsule Take 1 capsule (200 mg) by mouth daily 10 capsule 0     clopidogrel (PLAVIX) 75 MG tablet Take 75 mg by mouth daily       hydrochlorothiazide (HYDRODIURIL) 25 MG tablet Take 1 tablet (25 mg) by mouth daily 30 tablet 0     LACTOBACILLUS RHAMNOSUS, GG, PO Take 1 capsule by mouth daily       Lidocaine (LIDOCARE) 4 % Patch Place 2 patches onto the skin every 24 hours To prevent lidocaine  toxicity, patient should be patch free for 12 hrs daily.       losartan (COZAAR) 100 MG tablet Take 1 tablet (100 mg) by mouth daily 30 tablet 0     metoprolol tartrate (LOPRESSOR) 50 MG tablet Take 1 tablet (50 mg) by mouth 2 times daily 60 tablet 0     pregabalin (LYRICA) 100 MG capsule Take 1 capsule (100 mg) by mouth 2 times daily 30 capsule 0     sennosides (SENOKOT) 8.6 MG tablet Take 1 tablet by mouth 2 times daily        VITAMIN D, CHOLECALCIFEROL, PO Take 1,000 Units by mouth daily        Controlled medications:   Lyrica 15 day supply sent to patient's [facility]     Past Medical History:   Past Medical History:   Diagnosis Date     Allergic rhinitis      Chronic renal insufficiency      Edema of both legs      Endometriosis      Essential hypertension      HDL lipoprotein deficiency      HTN (hypertension)      Hypertriglyceridemia      Idiopathic neuropathy      Left knee DJD      Peripheral neuropathy      Pre-diabetes      Rash      Rectal polyp      Renal artery stenosis (H)      Toe ulcer, right (H)      Physical Exam:   Vitals: /69   Pulse 62   Temp 97.1  F (36.2  C)   Resp 18   Wt 85.3 kg (188 lb)   SpO2 93%   BMI 28.59 kg/m    BMI= Body mass index is 28.59 kg/m .   Exam is limited due to COVID-19 precautions  GENERAL APPEARANCE:  Alert, in no distress, cooperative  ENT:  Mouth normal, moist mucous membranes, normal hearing acuity  EYES:  Conjunctiva and lids normal  RESP:  no respiratory distress, on room air  CV: trace left leg edema  NEURO:   No facial asymmetry, speech clear  PSYCH:  oriented X 3, affect and mood normal     SNF labs: see [facility] for results    DISCHARGE PLAN:    Follow up labs: No labs orders/due    Medical Follow Up:      Follow up with primary care provider in 2-3 weeks  Follow up with specialist ortho as planned     Current [place] scheduled appointments:   No future appointments.     Discharge Services: Home Care:  Occupational Therapy, Physical Therapy,  Registered Nurse, Home Health Aide and From:  [facility]    Discharge Instructions Verbalized to Patient at Discharge:     None    TOTAL DISCHARGE TIME:   Greater than 30 minutes  Electronically signed by:  HUDSON Rivers CNP     Documentation of Face to Face and Certification for Home Health Services    I certify that services are/were furnished while this patient was under the care of a physician and that a physician or an allowed non-physician practitioner (NPP), had a face-to-face encounter that meets the physician face-to-face encounter requirements. The encounter was in whole, or in part, related to the primary reason for home health. The patient is confined to his/her home and needs intermittent skilled nursing, physical therapy, speech-language pathology, or the continued need for occupational therapy. A plan of care has been established by a physician and is periodically reviewed by a physician.  Date of Face-to-Face Encounter: 10/28/2021.    I certify that, based on my findings, the following services are medically necessary home health services: Nursing, Occupational Therapy, Physical Therapy and HHA.    My clinical findings support the need for the above skilled services because: Requires assistance of another person or specialized equipment to access medical services because patient: Is unable to exit home safely on own due to: weakness pain...    Patient to re-establish plan of care with their PCP within 7-10 days after leaving the facility to reestablish care.  Medicare certified [facility] provider: HUDSON Rivers CNP  Date: October 28, 2021

## 2021-10-28 ENCOUNTER — DISCHARGE SUMMARY NURSING HOME (OUTPATIENT)
Dept: GERIATRICS | Facility: CLINIC | Age: 81
End: 2021-10-28
Payer: COMMERCIAL

## 2021-10-28 DIAGNOSIS — Z96.652 AFTERCARE FOLLOWING LEFT KNEE JOINT REPLACEMENT SURGERY: ICD-10-CM

## 2021-10-28 DIAGNOSIS — I73.9 PAD (PERIPHERAL ARTERY DISEASE) (H): ICD-10-CM

## 2021-10-28 DIAGNOSIS — I10 ESSENTIAL HYPERTENSION: Primary | ICD-10-CM

## 2021-10-28 DIAGNOSIS — N18.31 STAGE 3A CHRONIC KIDNEY DISEASE (H): ICD-10-CM

## 2021-10-28 DIAGNOSIS — R41.0 DELIRIUM: ICD-10-CM

## 2021-10-28 DIAGNOSIS — R52 PAIN: ICD-10-CM

## 2021-10-28 DIAGNOSIS — R53.81 PHYSICAL DECONDITIONING: ICD-10-CM

## 2021-10-28 DIAGNOSIS — Z47.1 AFTERCARE FOLLOWING LEFT KNEE JOINT REPLACEMENT SURGERY: ICD-10-CM

## 2021-10-28 DIAGNOSIS — M17.12 OSTEOARTHRITIS OF LEFT KNEE, UNSPECIFIED OSTEOARTHRITIS TYPE: Primary | ICD-10-CM

## 2021-10-28 DIAGNOSIS — E78.5 DYSLIPIDEMIA: ICD-10-CM

## 2021-10-28 DIAGNOSIS — D62 ACUTE BLOOD LOSS ANEMIA: ICD-10-CM

## 2021-10-28 DIAGNOSIS — I10 ESSENTIAL HYPERTENSION: ICD-10-CM

## 2021-10-28 DIAGNOSIS — K59.01 SLOW TRANSIT CONSTIPATION: ICD-10-CM

## 2021-10-28 PROCEDURE — 99316 NF DSCHRG MGMT 30 MIN+: CPT | Performed by: NURSE PRACTITIONER

## 2021-10-28 RX ORDER — HYDROCHLOROTHIAZIDE 25 MG/1
25 TABLET ORAL DAILY
Qty: 30 TABLET | Refills: 0 | Status: SHIPPED | OUTPATIENT
Start: 2021-10-28

## 2021-10-28 RX ORDER — AMLODIPINE BESYLATE 10 MG/1
10 TABLET ORAL DAILY
Qty: 30 TABLET | Refills: 0 | Status: SHIPPED | OUTPATIENT
Start: 2021-10-28

## 2021-10-28 RX ORDER — CELECOXIB 200 MG/1
200 CAPSULE ORAL DAILY
Qty: 10 CAPSULE | Refills: 0 | Status: SHIPPED | OUTPATIENT
Start: 2021-10-28 | End: 2023-02-07

## 2021-10-28 RX ORDER — LOSARTAN POTASSIUM 100 MG/1
100 TABLET ORAL DAILY
Qty: 30 TABLET | Refills: 0 | Status: SHIPPED | OUTPATIENT
Start: 2021-10-28 | End: 2023-02-07

## 2021-10-28 RX ORDER — PREGABALIN 100 MG/1
100 CAPSULE ORAL 2 TIMES DAILY
Qty: 30 CAPSULE | Refills: 0 | Status: SHIPPED | OUTPATIENT
Start: 2021-10-28 | End: 2021-11-15

## 2021-10-28 RX ORDER — METOPROLOL TARTRATE 50 MG
50 TABLET ORAL 2 TIMES DAILY
Qty: 60 TABLET | Refills: 0 | Status: SHIPPED | OUTPATIENT
Start: 2021-10-28

## 2021-10-29 ENCOUNTER — LAB REQUISITION (OUTPATIENT)
Dept: LAB | Facility: CLINIC | Age: 81
End: 2021-10-29

## 2021-10-29 DIAGNOSIS — Z00.01 ENCOUNTER FOR GENERAL ADULT MEDICAL EXAMINATION WITH ABNORMAL FINDINGS: ICD-10-CM

## 2021-10-29 PROCEDURE — U0003 INFECTIOUS AGENT DETECTION BY NUCLEIC ACID (DNA OR RNA); SEVERE ACUTE RESPIRATORY SYNDROME CORONAVIRUS 2 (SARS-COV-2) (CORONAVIRUS DISEASE [COVID-19]), AMPLIFIED PROBE TECHNIQUE, MAKING USE OF HIGH THROUGHPUT TECHNOLOGIES AS DESCRIBED BY CMS-2020-01-R: HCPCS | Performed by: NURSE PRACTITIONER

## 2021-11-01 LAB — SARS-COV-2 RNA RESP QL NAA+PROBE: NOT DETECTED

## 2021-11-15 DIAGNOSIS — Z47.1 AFTERCARE FOLLOWING LEFT KNEE JOINT REPLACEMENT SURGERY: ICD-10-CM

## 2021-11-15 DIAGNOSIS — Z96.652 AFTERCARE FOLLOWING LEFT KNEE JOINT REPLACEMENT SURGERY: ICD-10-CM

## 2021-11-15 DIAGNOSIS — R52 PAIN: ICD-10-CM

## 2021-11-15 RX ORDER — PREGABALIN 100 MG/1
100 CAPSULE ORAL 2 TIMES DAILY
Qty: 30 CAPSULE | Refills: 0 | Status: SHIPPED | OUTPATIENT
Start: 2021-11-15 | End: 2023-02-07

## 2023-02-05 ENCOUNTER — LAB REQUISITION (OUTPATIENT)
Dept: LAB | Facility: CLINIC | Age: 83
End: 2023-02-05

## 2023-02-05 DIAGNOSIS — Z00.01 ENCOUNTER FOR GENERAL ADULT MEDICAL EXAMINATION WITH ABNORMAL FINDINGS: ICD-10-CM

## 2023-02-06 VITALS
BODY MASS INDEX: 31.04 KG/M2 | DIASTOLIC BLOOD PRESSURE: 75 MMHG | OXYGEN SATURATION: 95 % | HEART RATE: 79 BPM | HEIGHT: 68 IN | SYSTOLIC BLOOD PRESSURE: 148 MMHG | WEIGHT: 204.8 LBS | TEMPERATURE: 97.5 F | RESPIRATION RATE: 18 BRPM

## 2023-02-06 DIAGNOSIS — R52 SEVERE PAIN: Primary | ICD-10-CM

## 2023-02-06 PROCEDURE — 86481 TB AG RESPONSE T-CELL SUSP: CPT | Performed by: NURSE PRACTITIONER

## 2023-02-06 PROCEDURE — 36415 COLL VENOUS BLD VENIPUNCTURE: CPT | Performed by: NURSE PRACTITIONER

## 2023-02-06 PROCEDURE — P9604 ONE-WAY ALLOW PRORATED TRIP: HCPCS | Performed by: NURSE PRACTITIONER

## 2023-02-06 RX ORDER — TRAMADOL HYDROCHLORIDE 50 MG/1
50 TABLET ORAL EVERY 6 HOURS PRN
COMMUNITY
End: 2023-02-06

## 2023-02-06 RX ORDER — FLUTICASONE PROPIONATE 50 MCG
1 SPRAY, SUSPENSION (ML) NASAL DAILY PRN
COMMUNITY

## 2023-02-06 RX ORDER — MULTIVITAMIN,THERAPEUTIC
1 TABLET ORAL DAILY
COMMUNITY

## 2023-02-06 RX ORDER — AMOXICILLIN 500 MG/1
CAPSULE ORAL
COMMUNITY

## 2023-02-07 ENCOUNTER — TRANSITIONAL CARE UNIT VISIT (OUTPATIENT)
Dept: GERIATRICS | Facility: CLINIC | Age: 83
End: 2023-02-07
Payer: COMMERCIAL

## 2023-02-07 DIAGNOSIS — R53.81 PHYSICAL DECONDITIONING: ICD-10-CM

## 2023-02-07 DIAGNOSIS — Z71.89 ADVANCED DIRECTIVES, COUNSELING/DISCUSSION: ICD-10-CM

## 2023-02-07 DIAGNOSIS — I10 HYPERTENSION, UNSPECIFIED TYPE: ICD-10-CM

## 2023-02-07 DIAGNOSIS — I73.9 PAD (PERIPHERAL ARTERY DISEASE) (H): ICD-10-CM

## 2023-02-07 DIAGNOSIS — Z47.1 AFTERCARE FOLLOWING RIGHT KNEE JOINT REPLACEMENT SURGERY: Primary | ICD-10-CM

## 2023-02-07 DIAGNOSIS — D64.9 POSTOPERATIVE ANEMIA: ICD-10-CM

## 2023-02-07 DIAGNOSIS — Z96.651 AFTERCARE FOLLOWING RIGHT KNEE JOINT REPLACEMENT SURGERY: Primary | ICD-10-CM

## 2023-02-07 DIAGNOSIS — M17.10 ARTHRITIS OF KNEE: ICD-10-CM

## 2023-02-07 DIAGNOSIS — K59.01 SLOW TRANSIT CONSTIPATION: ICD-10-CM

## 2023-02-07 DIAGNOSIS — E78.5 DYSLIPIDEMIA: ICD-10-CM

## 2023-02-07 PROCEDURE — 99310 SBSQ NF CARE HIGH MDM 45: CPT | Performed by: NURSE PRACTITIONER

## 2023-02-07 RX ORDER — TRAMADOL HYDROCHLORIDE 50 MG/1
50 TABLET ORAL 2 TIMES DAILY
Qty: 30 TABLET | Refills: 0 | Status: SHIPPED | OUTPATIENT
Start: 2023-02-07 | End: 2023-02-08

## 2023-02-07 RX ORDER — PREGABALIN 75 MG/1
150 CAPSULE ORAL 2 TIMES DAILY
COMMUNITY
End: 2023-02-21

## 2023-02-07 RX ORDER — PREGABALIN 75 MG/1
150 CAPSULE ORAL 2 TIMES DAILY
Qty: 60 CAPSULE | Refills: 0 | Status: SHIPPED | OUTPATIENT
Start: 2023-02-07 | End: 2023-02-07

## 2023-02-07 RX ORDER — TRAMADOL HYDROCHLORIDE 50 MG/1
50 TABLET ORAL EVERY 6 HOURS PRN
Qty: 30 TABLET | Refills: 0 | Status: SHIPPED | OUTPATIENT
Start: 2023-02-07 | End: 2023-02-07

## 2023-02-07 RX ORDER — PREGABALIN 100 MG/1
100 CAPSULE ORAL 2 TIMES DAILY
COMMUNITY
End: 2023-02-07

## 2023-02-07 RX ORDER — AMOXICILLIN 250 MG
2 CAPSULE ORAL DAILY
COMMUNITY

## 2023-02-07 NOTE — PROGRESS NOTES
[facility] GERIATRICS    PRIMARY CARE PROVIDER AND CLINIC:  [facility] [place] [address] / [place] [address]  Chief Complaint   Patient presents with     Hospital F/U      [facility] Record Number:  [id number]  Place of Service where encounter took place:  [facility] ([facility]) [[id number]]    Sweetie Thao  is a 82 year old  (1940), admitted to the above facility from  [facility] . Hospital stay 2/1/23 through 2/4/23..   HPI:    82 y.o. female PMH HTN, PAD, CKD3 and right knee pain that has failed non-operative management hospitalized for planned right TKA.  ml. No complications. To TCU for rehab.     Patient seen for initial TCU visit. Reports pain moderate to severe, not well managed. No headaches, dizziness, chest pain, dyspnea, bladder issues. Constipated, unsure last stool. BP range 122-149/67-82 and sats 98% room air. Reviewed code status - full code desired.     CODE STATUS/ADVANCE DIRECTIVES DISCUSSION:  Full Code  CPR/Full code   ALLERGIES:   Allergies   Allergen Reactions     Amitriptyline      Contrast Dye      Cymbalta      Dust Mites      Gabapentin      Levofloxacin      Mold      Nortriptyline       PAST MEDICAL HISTORY:   Past Medical History:   Diagnosis Date     Allergic rhinitis      Chronic renal insufficiency      Edema of both legs      Endometriosis      Essential hypertension      HDL lipoprotein deficiency      HTN (hypertension)      Hypertriglyceridemia      Idiopathic neuropathy      Left knee DJD      Peripheral neuropathy      Pre-diabetes      Rash      Rectal polyp      Renal artery stenosis (H)      Toe ulcer, right (H)       PAST SURGICAL HISTORY:   has no past surgical history on file.  FAMILY HISTORY: family history includes Prostate Cancer in her father.  SOCIAL HISTORY:     Patient's living condition: lives alone    Post Discharge Medication Reconciliation Status:   MED REC REQUIRED  Post Medication  "Reconciliation Status:  Discharge medications reconciled and changed, see notes/orders    Current Outpatient Medications   Medication Sig     ACETAMINOPHEN PO Take 1,000 mg by mouth every 6 hours     amLODIPine (NORVASC) 10 MG tablet Take 1 tablet (10 mg) by mouth daily     aspirin 81 MG EC tablet Take 81 mg by mouth daily     ATORVASTATIN CALCIUM PO Take 80 mg by mouth daily     clopidogrel (PLAVIX) 75 MG tablet Take 75 mg by mouth daily     fluticasone (FLONASE) 50 MCG/ACT nasal spray Spray 1 spray into both nostrils daily as needed for rhinitis or allergies     hydrochlorothiazide (HYDRODIURIL) 25 MG tablet Take 1 tablet (25 mg) by mouth daily     metoprolol tartrate (LOPRESSOR) 50 MG tablet Take 1 tablet (50 mg) by mouth 2 times daily     multivitamin, therapeutic (THERA-VIT) TABS tablet Take 1 tablet by mouth daily     senna-docusate (SENOKOT-S/PERICOLACE) 8.6-50 MG tablet Take 2 tablets by mouth daily     VITAMIN D, CHOLECALCIFEROL, PO Take 1,000 Units by mouth daily     amoxicillin (AMOXIL) 500 MG capsule Give 4 capsule by mouth as needed for Dental prevention-post knee replacement Take 4 capsules 1 hour prior to any dental procedure, including routine cleaning (Patient not taking: Reported on 2/7/2023)     oxyCODONE (ROXICODONE) 5 MG tablet Take 1 tablet (5 mg) by mouth 3 times daily And 5-10 mg every 4 hrs PRN pain     pregabalin (LYRICA) 75 MG capsule Take 150 mg by mouth 2 times daily     traMADol (ULTRAM) 50 MG tablet Take 1 tablet (50 mg) by mouth 2 times daily And QID PRN     No current facility-administered medications for this visit.     ROS:  10 point ROS of systems including Constitutional, Eyes, Respiratory, Cardiovascular, Gastroenterology, Genitourinary, Integumentary, Musculoskeletal, Psychiatric were all negative except for pertinent positives noted in my HPI.    Vitals:  BP (!) 148/75   Pulse 79   Temp 97.5  F (36.4  C)   Resp 18   Ht 1.727 m (5' 8\")   Wt 92.9 kg (204 lb 12.8 oz)   SpO2 " 95%   BMI 31.14 kg/m    Exam:  GENERAL APPEARANCE:  Alert, in no distress, pleasant, cooperative, oriented x self, place, recent events  EYES:  EOM, lids, pupils and irises normal, sclera clear and conjunctiva normal, no discharge or mattering on lids or lashes noted  ENT:  Mouth normal, moist mucous membranes, nose normal without drainage or crusting, external ears without lesions, hearing acuity intact  NECK: supple, symmetrical, trachea midline  RESP:  respiratory effort normal, no chest wall tenderness, no respiratory distress, Lung sounds clear, patient is on room air  CV:  Auscultation of heart done, rate and rhythm controlled and regular, no murmur, no rub or gallop. Edema trace surgical leg.   ABDOMEN:  normal bowel sounds, soft, nontender, no palpable masses.  M/S:   Gait and station unsafe without assistance, no tenderness or swelling of the joints; able to move all extremities, digits normal  SKIN:  Inspection and palpation of skin and subcutaneous tissue: right knee incision open to air, no drainage or redness  NEURO: cranial nerves 2-12 grossly intact, no facial asymmetry, no speech deficits and able to follow directions, moves all extremities symmetrically  PSYCH:  insight and judgement and memory appear intact, affect and mood normal     Lab/Diagnostic data:  2/2 Cr 0.80  2/4 Hgb 10.1    ASSESSMENT/PLAN:  Aftercare following right knee joint replacement surgery  Arthritis of knee  Physical deconditioning  Acute on chronic. Due to pain change Lyrica to 150 mg BID, change tramadol to 50 mg BID and BID PRN, continue tylenol 1000 mg QID, vit d 1000 units daily. Continue therapies eval and treat and f/u progress. Ortho f/U as recommended.     Hypertension, unspecified type  Chronic, continue PTA Norvasc 10 mg daily, HCTZ 25 mg daily, lopressor 50 mg BID, monitor vs and review ranges next visit. BMP check 2/10    PAD (peripheral artery disease) (H)  Dyslipidemia  Chronic, continue asa 81 mg daily, Lipitor  80 mg daily, Plavix 75 mg daily, monitor vascular status.     Postoperative anemia  Acute with surgery. MVI daily, CBC check on 2/10.     Slow transit constipation  Acute, start senna s 2 tabs daily. Staff to update provider if not effective.     Advanced directives, counseling/discussion  Requests full code.     Orders:  1. Full Code  2. Change Lyrica to 150 mg PO BID diagnosis pain  3. Change tramadol to 50 mg PO BID and BID PRN pain  4. Senna s 2 tabs PO daily diagnosis constipation  5. CBC and BMP on 2/10 diagnosis anemia, CKD    Total unit/floor time of 45 minutes spent consisted of the following: examination of patient, reviewing the record including pertinent labs and imaging. More than 50% of this time was spent in coordination of care with the patient, nursing staff, and other healthcare providers. This time was spent on discussing the care plan including labs, discharge/safe placement, and specialty follow up. Additional specific discussion included review code status, management of pain, management of constipation, f/u labs, expected TCU course/routine/discharge planning and clarification of meds/orders with nursing for a total of over 24 min.    Electronically signed by:  HUDSON Rivers CNP

## 2023-02-07 NOTE — PROGRESS NOTES
[facility]ERIATRIC [facility]  INITIAL VISIT NOTE  February 8, 2023    PRIMARY CARE PROVIDER AND CLINIC:  Devorah AguilarA [address] / [place] [address]    CHIEF COMPLAINT:  Hospital follow-up/Initial visit    HPI:    Sweetie Thao is a 82 year old  (1940) female who was seen at [place] on [facility] T[facility] on February 8, 2023 for an initial visit.     Medical history is notable hypertension, dyslipidemia, PAD, peripheral neuropathy, CKD stage II-IIIa, prediabetes, traumatic SDH/SAH, UTI, pneumonia, endometriosis, allergic rhinitis, and osteoarthritis.    Summary of hospital course:  Patient was hospitalized at [facility] from February 1 through February 4, 2023 for elective right total knee arthroplasty for surgical treatment of primary osteoarthritis.  Surgery was achieved on February 1, 2023 which was well-tolerated.  EBL was 100 mL and hemoglobin postop dropped to 9.7 from preop level of 13.3. She was seen and evaluated by physical therapy and rehab program was initiated.  TCU was recommended per therapies.    Patient is admitted to this facility for medical management, nursing care, and rehab.     Of note, history was obtained from patient, facility RN, and extensive review of the chart.    Today's visit:  Patient was seen in her room, while lying in bed.  She had a rough night due to severe pain in her right knee and leg rating at 9-10 out of 10.  She reports no fever chills, chest pain, palpitation, dyspnea, nausea, vomiting, abdominal pain, or urinary symptoms.  She had a bowel movement 2 days ago.      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 coli and Enterococcus faecalis  Pneumonia  Allergic rhinitis  Endometriosis  Rectal polyp  Allergic rhinitis  Osteoarthritis    Past Medical History:   Diagnosis Date     Allergic rhinitis      Chronic renal insufficiency      Edema of both legs      Endometriosis      Essential hypertension      HDL lipoprotein deficiency      HTN (hypertension)      Hypertriglyceridemia      Idiopathic neuropathy      Left knee DJD      Peripheral neuropathy      Pre-diabetes      Rash      Rectal polyp      Renal artery stenosis (H)      Toe ulcer, right (H)        PAST SURGICAL HISTORY:   Bilateral total knee arthroplasty  Hysterectomy  Urethral reconstruction  Right foot surgery x2    FAMILY HISTORY:   Family History   Problem Relation Age of Onset     Prostate Cancer Father        SOCIAL HISTORY:  Social History     Tobacco Use     Smoking status: Not on file     Smokeless tobacco: Not on file   Substance Use Topics     Alcohol use: Not on file       MEDICATIONS:  Current Outpatient Medications   Medication Sig Dispense Refill     ACETAMINOPHEN PO Take 1,000 mg by mouth every 6 hours       amLODIPine (NORVASC) 10 MG tablet Take 1 tablet (10 mg) by mouth daily 30 tablet 0     amoxicillin (AMOXIL) 500 MG capsule Give 4 capsule by mouth as needed for Dental prevention-post knee replacement Take 4 capsules 1 hour prior to any dental procedure, including routine cleaning (Patient not taking: Reported on 2/7/2023)       aspirin 81 MG EC tablet Take 81 mg by mouth daily       ATORVASTATIN CALCIUM PO Take 80 mg by mouth daily       clopidogrel (PLAVIX) 75 MG tablet Take 75 mg by mouth daily       fluticasone (FLONASE) 50 MCG/ACT nasal spray Spray 1 spray into both nostrils daily as needed for rhinitis or allergies       hydrochlorothiazide (HYDRODIURIL) 25 MG tablet Take 1 tablet (25 mg) by mouth daily 30 tablet 0     metoprolol tartrate  "(LOPRESSOR) 50 MG tablet Take 1 tablet (50 mg) by mouth 2 times daily 60 tablet 0     multivitamin, therapeutic (THERA-VIT) TABS tablet Take 1 tablet by mouth daily       pregabalin (LYRICA) 75 MG capsule Take 2 capsules (150 mg) by mouth 2 times daily 60 capsule 0     senna-docusate (SENOKOT-S/PERICOLACE) 8.6-50 MG tablet Take 2 tablets by mouth daily       traMADol (ULTRAM) 50 MG tablet Take 1 tablet (50 mg) by mouth 2 times daily And BID PRN 30 tablet 0     VITAMIN D, CHOLECALCIFEROL, PO Take 1,000 Units by mouth daily         MED REC REQUIRED  Post Medication Reconciliation Status: medication reconcilation previously completed during another office visit        ALLERGIES:  Allergies   Allergen Reactions     Amitriptyline      Contrast Dye      Cymbalta      Dust Mites      Gabapentin      Levofloxacin      Mold      Nortriptyline        ROS:  10 point ROS were negative other than the symptoms noted above in the HPI.    PHYSICAL EXAM:  Vital signs were reviewed in the chart.  Vital Signs: BP (!) 146/77   Pulse 66   Temp 97.5  F (36.4  C)   Resp 18   Ht 1.727 m (5' 8\")   Wt 91.9 kg (202 lb 9.6 oz)   SpO2 93%   BMI 30.81 kg/m    General: Weak appearing but in no acute distress  HEENT: Mild conjunctival pallor, no scleral icterus or injection, moist oral mucosa  Cardiovascular: Normal S1, S2, RRR  Respiratory: Lungs clear to auscultation bilaterally  GI: Abdomen soft, non-tender, non-distended, +BS  Extremities: Mild swelling of right knee, no ankle edema and no calf tenderness  Neuro: CX II-XII grossly intact; ROM in all four extremities grossly intact  Psych: Alert and oriented x3; normal affect  Skin: Right knee surgical wound is dressed and clean    LABORATORY/IMAGING DATA:  All relevant labs and imaging data in [facility] and/or Care Everywhere were personally reviewed today.    Hematology profile (February 4, 2023): Hemoglobin 10.1    Chemistry profile (February 2, 2023): Creatinine " 0.8    ASSESSMENT/PLAN:  Right knee osteoarthritis, s/p right total knee arthroplasty on February 1, 2023,  Physical deconditioning.  Patient is hemodynamically stable  Severe pain in her right knee/leg last night.  PTA pregabalin dose was increased from 100 mg twice daily to 150 mg twice daily on February 7.  Plan:  Increase PRN tramadol to 50 mg 4 times daily  Continue tramadol 50 mg twice daily  Continue acetaminophen 1000 mg every 6 hours   Continue pregabalin 150 twice daily  Continue DVT prophylaxis with aspirin 81 mg p.o. daily  WBAT RLE  Continue PT/OT evaluation and therapy  Follow-up with Ortho in 10-14 days as instructed     Acute blood loss anemia.  Due to knee surgery, postop hemoglobin dropped to 9.7 on February 3 from preoperative level of 13.3.  Hemoglobin at discharge was 10.1.  Plan:  Monitor hemoglobin periodically  CBC on February 10  Transfuse PRN for hemoglobin less than 7     Essential hypertension.  Blood pressure is fairly controlled.  Plan:  Continue PTA amlodipine 10 mg p.o. daily  Continue metoprolol 50 mg p.o. twice daily  Continue hydrochlorothiazide 25 mg p.o. daily  Monitor blood pressure     PAD, s/p right SFA balloon angioplasty in 2017,  Bilateral renal artery stenosis,  Dyslipidemia.  Plan:  Continue aspirin 81 mg p.o. daily and Plavix 75 mg p.o. daily  Continue atorvastatin 80 mg p.o. at bedtime  Follow-up as recommended    Peripheral neuropathy.  Pregabalin dose was increased from 100 mg twice daily to 150 mg twice daily on February 7.  Plan:  Continue pregabalin 150 mg p.o. twice daily     CKD stage II-IIIa.  Baseline creatinine 0.8-1.1  Last creatinine was stable at 0.8 on February 2, 2023.  Plan:  Avoid NSAIDs and nephrotoxins  Monitor renal function periodically  Next BMP on February 10     History of prediabetes.  Now resolved.  Last hemoglobin A1c was 4.8% in June 2018.  Plan:  Monitor blood glucose PRN     Slow transit constipation.  Plan:  Continue the bowel  regimen        Orders written by provider at facility:  Increase PRN tramadol to 50 mg 4 times daily        Disclaimer: This note may contain text created using speech-recognition software and may contain unintended word substitutions.      Electronically signed by:  Geronimo Glover MD

## 2023-02-08 ENCOUNTER — TRANSITIONAL CARE UNIT VISIT (OUTPATIENT)
Dept: GERIATRICS | Facility: CLINIC | Age: 83
End: 2023-02-08
Payer: COMMERCIAL

## 2023-02-08 VITALS
RESPIRATION RATE: 18 BRPM | OXYGEN SATURATION: 93 % | HEIGHT: 68 IN | SYSTOLIC BLOOD PRESSURE: 146 MMHG | WEIGHT: 202.6 LBS | DIASTOLIC BLOOD PRESSURE: 77 MMHG | BODY MASS INDEX: 30.71 KG/M2 | TEMPERATURE: 97.5 F | HEART RATE: 66 BPM

## 2023-02-08 DIAGNOSIS — E78.5 DYSLIPIDEMIA: ICD-10-CM

## 2023-02-08 DIAGNOSIS — Z96.651 STATUS POST TOTAL RIGHT KNEE REPLACEMENT: ICD-10-CM

## 2023-02-08 DIAGNOSIS — Z47.1 AFTERCARE FOLLOWING LEFT KNEE JOINT REPLACEMENT SURGERY: ICD-10-CM

## 2023-02-08 DIAGNOSIS — I73.9 PAD (PERIPHERAL ARTERY DISEASE) (H): ICD-10-CM

## 2023-02-08 DIAGNOSIS — D62 ACUTE BLOOD LOSS ANEMIA: ICD-10-CM

## 2023-02-08 DIAGNOSIS — G62.9 NEUROPATHY: ICD-10-CM

## 2023-02-08 DIAGNOSIS — Z96.652 AFTERCARE FOLLOWING LEFT KNEE JOINT REPLACEMENT SURGERY: ICD-10-CM

## 2023-02-08 DIAGNOSIS — I10 ESSENTIAL HYPERTENSION: ICD-10-CM

## 2023-02-08 DIAGNOSIS — R53.81 PHYSICAL DECONDITIONING: ICD-10-CM

## 2023-02-08 DIAGNOSIS — N18.2 STAGE 2 CHRONIC KIDNEY DISEASE: ICD-10-CM

## 2023-02-08 DIAGNOSIS — M17.11 PRIMARY OSTEOARTHRITIS OF RIGHT KNEE: Primary | ICD-10-CM

## 2023-02-08 DIAGNOSIS — K59.01 SLOW TRANSIT CONSTIPATION: ICD-10-CM

## 2023-02-08 LAB
GAMMA INTERFERON BACKGROUND BLD IA-ACNC: 0.02 IU/ML
M TB IFN-G BLD-IMP: NEGATIVE
M TB IFN-G CD4+ BCKGRND COR BLD-ACNC: 1.8 IU/ML
MITOGEN IGNF BCKGRD COR BLD-ACNC: 0.01 IU/ML
MITOGEN IGNF BCKGRD COR BLD-ACNC: 0.01 IU/ML
QUANTIFERON MITOGEN: 1.82 IU/ML
QUANTIFERON NIL TUBE: 0.02 IU/ML
QUANTIFERON TB1 TUBE: 0.03 IU/ML
QUANTIFERON TB2 TUBE: 0.03

## 2023-02-08 PROCEDURE — 99305 1ST NF CARE MODERATE MDM 35: CPT | Performed by: INTERNAL MEDICINE

## 2023-02-08 RX ORDER — TRAMADOL HYDROCHLORIDE 50 MG/1
50 TABLET ORAL 2 TIMES DAILY
Qty: 30 TABLET | Refills: 0 | Status: SHIPPED | OUTPATIENT
Start: 2023-02-08 | End: 2023-02-22

## 2023-02-08 NOTE — LETTER
2/8/2023        RE: Sweetie Thao  [address]  [address]  [place] [address]        [facility]IATRIC [facility]  INITIAL VISIT NOTE  February 8, 2023    PRIMARY CARE PROVIDER AND CLINIC:  Devorah Aguilar [address] / [place] [address]    CHIEF COMPLAINT:  Hospital follow-up/Initial visit    HPI:    Sweetie Thao is a 82 year old  (1940) female who was seen at [place] [facility] T[facility] on February 8, 2023 for an initial visit.     Medical history is notable hypertension, dyslipidemia, PAD, peripheral neuropathy, CKD stage II-IIIa, prediabetes, traumatic SDH/SAH, UTI, pneumonia, endometriosis, allergic rhinitis, and osteoarthritis.    Summary of hospital course:  Patient was hospitalized at [facility] from February 1 through February 4, 2023 for elective right total knee arthroplasty for surgical treatment of primary osteoarthritis.  Surgery was achieved on February 1, 2023 which was well-tolerated.  EBL was 100 mL and hemoglobin postop dropped to 9.7 from preop level of 13.3. She was seen and evaluated by physical therapy and rehab program was initiated.  TCU was recommended per therapies.    Patient is admitted to this facility for medical management, nursing care, and rehab.     Of note, history was obtained from patient, facility RN, and extensive review of the chart.    Today's visit:  Patient was seen in her room, while lying in bed.  She had a rough night due to severe pain in her right knee and leg rating at 9-10 out of 10.  She reports no fever chills, chest pain, palpitation, dyspnea, nausea, vomiting, abdominal pain, or urinary symptoms.  She had a bowel movement 2 days ago.      CODE STATUS:   CPR/Full code     PAST MEDICAL HISTORY:   Hypertension  Dyslipidemia  PAD, s/p right SFA balloon angioplasty in 2017  Bilateral renal artery stenosis  Peripheral neuropathy  Mildly dilated ascending aorta, measuring 4.3 cm, per echo in February 2018  Right 3rd   toe distal phalanx osteomyelitis  CKD stage II-IIIa (baseline creatinine 0.8-1.1)  Prediabetes  SDH/SAH(traumatic after a fall) in March 2018  UTI due to ESBL producing E. coli and Enterococcus faecalis  Pneumonia  Allergic rhinitis  Endometriosis  Rectal polyp  Allergic rhinitis  Osteoarthritis    Past Medical History:   Diagnosis Date     Allergic rhinitis      Chronic renal insufficiency      Edema of both legs      Endometriosis      Essential hypertension      HDL lipoprotein deficiency      HTN (hypertension)      Hypertriglyceridemia      Idiopathic neuropathy      Left knee DJD      Peripheral neuropathy      Pre-diabetes      Rash      Rectal polyp      Renal artery stenosis (H)      Toe ulcer, right (H)        PAST SURGICAL HISTORY:   Bilateral total knee arthroplasty  Hysterectomy  Urethral reconstruction  Right foot surgery x2    FAMILY HISTORY:   Family History   Problem Relation Age of Onset     Prostate Cancer Father        SOCIAL HISTORY:  Social History     Tobacco Use     Smoking status: Not on file     Smokeless tobacco: Not on file   Substance Use Topics     Alcohol use: Not on file       MEDICATIONS:  Current Outpatient Medications   Medication Sig Dispense Refill     ACETAMINOPHEN PO Take 1,000 mg by mouth every 6 hours       amLODIPine (NORVASC) 10 MG tablet Take 1 tablet (10 mg) by mouth daily 30 tablet 0     amoxicillin (AMOXIL) 500 MG capsule Give 4 capsule by mouth as needed for Dental prevention-post knee replacement Take 4 capsules 1 hour prior to any dental procedure, including routine cleaning (Patient not taking: Reported on 2/7/2023)       aspirin 81 MG EC tablet Take 81 mg by mouth daily       ATORVASTATIN CALCIUM PO Take 80 mg by mouth daily       clopidogrel (PLAVIX) 75 MG tablet Take 75 mg by mouth daily       fluticasone (FLONASE) 50 MCG/ACT nasal spray Spray 1 spray into both nostrils daily as needed for rhinitis or allergies       hydrochlorothiazide (HYDRODIURIL) 25 MG  "tablet Take 1 tablet (25 mg) by mouth daily 30 tablet 0     metoprolol tartrate (LOPRESSOR) 50 MG tablet Take 1 tablet (50 mg) by mouth 2 times daily 60 tablet 0     multivitamin, therapeutic (THERA-VIT) TABS tablet Take 1 tablet by mouth daily       pregabalin (LYRICA) 75 MG capsule Take 2 capsules (150 mg) by mouth 2 times daily 60 capsule 0     senna-docusate (SENOKOT-S/PERICOLACE) 8.6-50 MG tablet Take 2 tablets by mouth daily       traMADol (ULTRAM) 50 MG tablet Take 1 tablet (50 mg) by mouth 2 times daily And BID PRN 30 tablet 0     VITAMIN D, CHOLECALCIFEROL, PO Take 1,000 Units by mouth daily         MED REC REQUIRED  Post Medication Reconciliation Status: medication reconcilation previously completed during another office visit        ALLERGIES:  Allergies   Allergen Reactions     Amitriptyline      Contrast Dye      Cymbalta      Dust Mites      Gabapentin      Levofloxacin      Mold      Nortriptyline        ROS:  10 point ROS were negative other than the symptoms noted above in the HPI.    PHYSICAL EXAM:  Vital signs were reviewed in the chart.  Vital Signs: BP (!) 146/77   Pulse 66   Temp 97.5  F (36.4  C)   Resp 18   Ht 1.727 m (5' 8\")   Wt 91.9 kg (202 lb 9.6 oz)   SpO2 93%   BMI 30.81 kg/m    General: Weak appearing but in no acute distress  HEENT: Mild conjunctival pallor, no scleral icterus or injection, moist oral mucosa  Cardiovascular: Normal S1, S2, RRR  Respiratory: Lungs clear to auscultation bilaterally  GI: Abdomen soft, non-tender, non-distended, +BS  Extremities: Mild swelling of right knee, no ankle edema and no calf tenderness  Neuro: CX II-XII grossly intact; ROM in all four extremities grossly intact  Psych: Alert and oriented x3; normal affect  Skin: Right knee surgical wound is dressed and clean    LABORATORY/IMAGING DATA:  All relevant labs and imaging data in [facility] and/or Care Everywhere were personally reviewed today.    Hematology profile (February 4, 2023): Hemoglobin " 10.1    Chemistry profile (February 2, 2023): Creatinine 0.8    ASSESSMENT/PLAN:  Right knee osteoarthritis, s/p right total knee arthroplasty on February 1, 2023,  Physical deconditioning.  Patient is hemodynamically stable  Severe pain in her right knee/leg last night.  PTA pregabalin dose was increased from 100 mg twice daily to 150 mg twice daily on February 7.  Plan:  Increase PRN tramadol to 50 mg 4 times daily  Continue tramadol 50 mg twice daily  Continue acetaminophen 1000 mg every 6 hours   Continue pregabalin 150 twice daily  Continue DVT prophylaxis with aspirin 81 mg p.o. daily  WBAT RLE  Continue PT/OT evaluation and therapy  Follow-up with Ortho in 10-14 days as instructed     Acute blood loss anemia.  Due to knee surgery, postop hemoglobin dropped to 9.7 on February 3 from preoperative level of 13.3.  Hemoglobin at discharge was 10.1.  Plan:  Monitor hemoglobin periodically  CBC on February 10  Transfuse PRN for hemoglobin less than 7     Essential hypertension.  Blood pressure is fairly controlled.  Plan:  Continue PTA amlodipine 10 mg p.o. daily  Continue metoprolol 50 mg p.o. twice daily  Continue hydrochlorothiazide 25 mg p.o. daily  Monitor blood pressure     PAD, s/p right SFA balloon angioplasty in 2017,  Bilateral renal artery stenosis,  Dyslipidemia.  Plan:  Continue aspirin 81 mg p.o. daily and Plavix 75 mg p.o. daily  Continue atorvastatin 80 mg p.o. at bedtime  Follow-up as recommended    Peripheral neuropathy.  Pregabalin dose was increased from 100 mg twice daily to 150 mg twice daily on February 7.  Plan:  Continue pregabalin 150 mg p.o. twice daily     CKD stage II-IIIa.  Baseline creatinine 0.8-1.1  Last creatinine was stable at 0.8 on February 2, 2023.  Plan:  Avoid NSAIDs and nephrotoxins  Monitor renal function periodically  Next BMP on February 10     History of prediabetes.  Now resolved.  Last hemoglobin A1c was 4.8% in June 2018.  Plan:  Monitor blood glucose PRN     Slow  transit constipation.  Plan:  Continue the bowel regimen        Orders written by provider at facility:  Increase PRN tramadol to 50 mg 4 times daily        Disclaimer: This note may contain text created using speech-recognition software and may contain unintended word substitutions.      Electronically signed by:  Geronimo Glover MD                          Sincerely,        Geronimo Glover MD

## 2023-02-09 ENCOUNTER — LAB REQUISITION (OUTPATIENT)
Dept: LAB | Facility: CLINIC | Age: 83
End: 2023-02-09

## 2023-02-09 DIAGNOSIS — N18.9 CHRONIC KIDNEY DISEASE, UNSPECIFIED: ICD-10-CM

## 2023-02-09 DIAGNOSIS — D64.9 ANEMIA, UNSPECIFIED: ICD-10-CM

## 2023-02-10 LAB
ANION GAP SERPL CALCULATED.3IONS-SCNC: 9 MMOL/L (ref 7–15)
BUN SERPL-MCNC: 15 MG/DL (ref 8–23)
CALCIUM SERPL-MCNC: 8.9 MG/DL (ref 8.8–10.2)
CHLORIDE SERPL-SCNC: 99 MMOL/L (ref 98–107)
CREAT SERPL-MCNC: 0.76 MG/DL (ref 0.51–0.95)
DEPRECATED HCO3 PLAS-SCNC: 31 MMOL/L (ref 22–29)
ERYTHROCYTE [DISTWIDTH] IN BLOOD BY AUTOMATED COUNT: 14.6 % (ref 10–15)
GFR SERPL CREATININE-BSD FRML MDRD: 79 ML/MIN/1.73M2
GLUCOSE SERPL-MCNC: 85 MG/DL (ref 70–99)
HCT VFR BLD AUTO: 33.5 % (ref 35–47)
HGB BLD-MCNC: 10.8 G/DL (ref 11.7–15.7)
MCH RBC QN AUTO: 29.3 PG (ref 26.5–33)
MCHC RBC AUTO-ENTMCNC: 32.2 G/DL (ref 31.5–36.5)
MCV RBC AUTO: 91 FL (ref 78–100)
PLATELET # BLD AUTO: 285 10E3/UL (ref 150–450)
POTASSIUM SERPL-SCNC: 3.1 MMOL/L (ref 3.4–5.3)
RBC # BLD AUTO: 3.69 10E6/UL (ref 3.8–5.2)
SODIUM SERPL-SCNC: 139 MMOL/L (ref 136–145)
WBC # BLD AUTO: 7.3 10E3/UL (ref 4–11)

## 2023-02-10 PROCEDURE — 82310 ASSAY OF CALCIUM: CPT | Performed by: NURSE PRACTITIONER

## 2023-02-10 PROCEDURE — 85027 COMPLETE CBC AUTOMATED: CPT | Performed by: NURSE PRACTITIONER

## 2023-02-10 PROCEDURE — P9603 ONE-WAY ALLOW PRORATED MILES: HCPCS | Performed by: NURSE PRACTITIONER

## 2023-02-12 ENCOUNTER — LAB REQUISITION (OUTPATIENT)
Dept: LAB | Facility: CLINIC | Age: 83
End: 2023-02-12

## 2023-02-12 DIAGNOSIS — E87.6 HYPOKALEMIA: ICD-10-CM

## 2023-02-13 VITALS
SYSTOLIC BLOOD PRESSURE: 135 MMHG | DIASTOLIC BLOOD PRESSURE: 72 MMHG | WEIGHT: 202.6 LBS | OXYGEN SATURATION: 93 % | BODY MASS INDEX: 30.71 KG/M2 | TEMPERATURE: 97.6 F | HEIGHT: 68 IN | HEART RATE: 60 BPM | RESPIRATION RATE: 18 BRPM

## 2023-02-13 LAB
ANION GAP SERPL CALCULATED.3IONS-SCNC: 12 MMOL/L (ref 7–15)
BUN SERPL-MCNC: 19.4 MG/DL (ref 8–23)
CALCIUM SERPL-MCNC: 9.7 MG/DL (ref 8.8–10.2)
CHLORIDE SERPL-SCNC: 100 MMOL/L (ref 98–107)
CREAT SERPL-MCNC: 0.87 MG/DL (ref 0.51–0.95)
DEPRECATED HCO3 PLAS-SCNC: 30 MMOL/L (ref 22–29)
GFR SERPL CREATININE-BSD FRML MDRD: 67 ML/MIN/1.73M2
GLUCOSE SERPL-MCNC: 87 MG/DL (ref 70–99)
POTASSIUM SERPL-SCNC: 3.8 MMOL/L (ref 3.4–5.3)
SODIUM SERPL-SCNC: 142 MMOL/L (ref 136–145)

## 2023-02-13 PROCEDURE — 36415 COLL VENOUS BLD VENIPUNCTURE: CPT | Performed by: NURSE PRACTITIONER

## 2023-02-13 PROCEDURE — P9604 ONE-WAY ALLOW PRORATED TRIP: HCPCS | Performed by: NURSE PRACTITIONER

## 2023-02-13 PROCEDURE — 80048 BASIC METABOLIC PNL TOTAL CA: CPT | Performed by: NURSE PRACTITIONER

## 2023-02-14 ENCOUNTER — TRANSITIONAL CARE UNIT VISIT (OUTPATIENT)
Dept: GERIATRICS | Facility: CLINIC | Age: 83
End: 2023-02-14
Payer: COMMERCIAL

## 2023-02-14 DIAGNOSIS — Z53.9 ERRONEOUS ENCOUNTER--DISREGARD: Primary | ICD-10-CM

## 2023-02-15 ENCOUNTER — TRANSITIONAL CARE UNIT VISIT (OUTPATIENT)
Dept: GERIATRICS | Facility: CLINIC | Age: 83
End: 2023-02-15
Payer: COMMERCIAL

## 2023-02-15 VITALS
WEIGHT: 194.8 LBS | TEMPERATURE: 97.9 F | HEIGHT: 68 IN | OXYGEN SATURATION: 94 % | BODY MASS INDEX: 29.52 KG/M2 | RESPIRATION RATE: 18 BRPM | DIASTOLIC BLOOD PRESSURE: 76 MMHG | HEART RATE: 67 BPM | SYSTOLIC BLOOD PRESSURE: 128 MMHG

## 2023-02-15 DIAGNOSIS — I10 ESSENTIAL HYPERTENSION: ICD-10-CM

## 2023-02-15 DIAGNOSIS — K59.01 SLOW TRANSIT CONSTIPATION: ICD-10-CM

## 2023-02-15 DIAGNOSIS — Z96.652 AFTERCARE FOLLOWING LEFT KNEE JOINT REPLACEMENT SURGERY: Primary | ICD-10-CM

## 2023-02-15 DIAGNOSIS — I73.9 PAD (PERIPHERAL ARTERY DISEASE) (H): ICD-10-CM

## 2023-02-15 DIAGNOSIS — Z47.1 AFTERCARE FOLLOWING LEFT KNEE JOINT REPLACEMENT SURGERY: Primary | ICD-10-CM

## 2023-02-15 DIAGNOSIS — M17.11 PRIMARY OSTEOARTHRITIS OF RIGHT KNEE: ICD-10-CM

## 2023-02-15 DIAGNOSIS — D62 ACUTE BLOOD LOSS ANEMIA: ICD-10-CM

## 2023-02-15 DIAGNOSIS — Z96.651 STATUS POST TOTAL RIGHT KNEE REPLACEMENT: ICD-10-CM

## 2023-02-15 DIAGNOSIS — E87.6 HYPOKALEMIA: ICD-10-CM

## 2023-02-15 DIAGNOSIS — R53.81 PHYSICAL DECONDITIONING: ICD-10-CM

## 2023-02-15 DIAGNOSIS — E78.5 DYSLIPIDEMIA: ICD-10-CM

## 2023-02-15 PROCEDURE — 99309 SBSQ NF CARE MODERATE MDM 30: CPT | Performed by: NURSE PRACTITIONER

## 2023-02-15 RX ORDER — OXYCODONE HYDROCHLORIDE 5 MG/1
5 TABLET ORAL 3 TIMES DAILY
Qty: 30 TABLET | Refills: 0 | Status: SHIPPED | OUTPATIENT
Start: 2023-02-15 | End: 2023-02-21

## 2023-02-15 NOTE — PROGRESS NOTES
"[facility] GERIATRICS    Chief Complaint   Patient presents with     RECHECK     HPI:  Sweetie Thao is a 82 year old  (1940), who is being seen today for an episodic care visit at: [facility] (TCU) [[id number]].     Per recent TCU provider progress notes:   82 y.o. female PMH HTN, PAD, CKD3 and right knee pain that has failed non-operative management hospitalized for planned right TKA.  ml. No complications. To TCU for rehab.     Today's concern is: episodic f/u pain, mobility, vs. Reports pain not managed well. Asks to switch to different pain pill from Tramadol. No headaches, dizziness, chest pain, dyspnea, bowel or bladder issues. Walked 10 ft with FWW in therapies. Sats 93% room air. BP range 128-158/67-73.     Allergies, and PMH/PSH reviewed in EPIC today.  REVIEW OF SYSTEMS:  4 point ROS including Respiratory, CV, GI and , other than that noted in the HPI,  is negative    Objective:   /76   Pulse 67   Temp 97.9  F (36.6  C)   Resp 18   Ht 1.727 m (5' 8\")   Wt 88.4 kg (194 lb 12.8 oz)   SpO2 94%   BMI 29.62 kg/m    GENERAL APPEARANCE:  Alert, in no distress, pleasant, cooperative, oriented x self, place, recent events  EYES:  EOM, lids, pupils and irises normal, sclera clear and conjunctiva normal, no discharge or mattering on lids or lashes noted  ENT:  Mouth normal, moist mucous membranes, nose normal without drainage or crusting, external ears without lesions, hearing acuity intact  RESP:  respiratory effort normal, no chest wall tenderness, no respiratory distress, Lung sounds clear, patient is on room air  CV:  Auscultation of heart done, rate and rhythm controlled and regular, no murmur, no rub or gallop. Edema trace surgical leg.   ABDOMEN:  normal bowel sounds, soft, nontender, no palpable masses.  M/S:   Gait and station unsafe without assistance, no tenderness or swelling of the joints; able to move all extremities, digits normal  SKIN:  Inspection and palpation " of skin and subcutaneous tissue: right knee incision open to air, no drainage or redness  NEURO: cranial nerves 2-12 grossly intact, no facial asymmetry, no speech deficits and able to follow directions, moves all extremities symmetrically  PSYCH:  insight and judgement and memory appear intact, affect and mood normal       2/13 Na 142, K 3.8, Cr 0.87  2/10 Hgb 10.8, Na 139, K 3.1, Cr 0.70    Assessment/Plan:  Aftercare following right knee joint replacement surgery  Arthritis of knee  Physical deconditioning  Acute on chronic. Due to pain changed Lyrica to 150 mg BID, continue tylenol 1000 mg QID, vit d 1000 units daily. Stop tramadol - not effective. Start oxycodone 5 mg PO TID and 5-10 mg PO every 4 hrs PRN pain. Continue therapies eval and treat and f/u progress. Ortho F/U as recommended.     Hypertension, unspecified type  Chronic, continue PTA Norvasc 10 mg daily, HCTZ 25 mg daily, lopressor 50 mg BID, monitor vs and review ranges next visit. BMP check 2/10 with Cr 0.87    PAD (peripheral artery disease) (H)  Dyslipidemia  Chronic, continue asa 81 mg daily, Lipitor 80 mg daily, Plavix 75 mg daily, monitor vascular status.     Postoperative anemia  Acute with surgery. MVI daily, CBC check on 2/10 with Hgb 10.8. Check PRN.     Slow transit constipation  Acute, started senna s 2 tabs daily. Effective.     Hypokalemia  Noted on 2/10 K 3.1. Replaced, K up to 3.8 on 2/13. Monitor PRN    MED REC REQUIRED  Post Medication Reconciliation Status:  Discharge medications reconciled and changed, see notes/orders    Orders:  Stop tramadol. Start oxycodone 5 mg TID and 5-10 mg every 4 hrs PRN pain    Electronically signed by: HUDSON Rivers CNP

## 2023-02-17 ENCOUNTER — TRANSITIONAL CARE UNIT VISIT (OUTPATIENT)
Dept: GERIATRICS | Facility: CLINIC | Age: 83
End: 2023-02-17
Payer: COMMERCIAL

## 2023-02-17 VITALS
WEIGHT: 194.8 LBS | DIASTOLIC BLOOD PRESSURE: 76 MMHG | OXYGEN SATURATION: 97 % | TEMPERATURE: 97.7 F | HEART RATE: 65 BPM | BODY MASS INDEX: 29.52 KG/M2 | SYSTOLIC BLOOD PRESSURE: 126 MMHG | HEIGHT: 68 IN | RESPIRATION RATE: 18 BRPM

## 2023-02-17 DIAGNOSIS — Z47.1 AFTERCARE FOLLOWING LEFT KNEE JOINT REPLACEMENT SURGERY: Primary | ICD-10-CM

## 2023-02-17 DIAGNOSIS — R53.81 PHYSICAL DECONDITIONING: ICD-10-CM

## 2023-02-17 DIAGNOSIS — I73.9 PAD (PERIPHERAL ARTERY DISEASE) (H): ICD-10-CM

## 2023-02-17 DIAGNOSIS — M17.11 PRIMARY OSTEOARTHRITIS OF RIGHT KNEE: ICD-10-CM

## 2023-02-17 DIAGNOSIS — Z96.652 AFTERCARE FOLLOWING LEFT KNEE JOINT REPLACEMENT SURGERY: Primary | ICD-10-CM

## 2023-02-17 DIAGNOSIS — E78.5 DYSLIPIDEMIA: ICD-10-CM

## 2023-02-17 DIAGNOSIS — D62 ACUTE BLOOD LOSS ANEMIA: ICD-10-CM

## 2023-02-17 DIAGNOSIS — I10 ESSENTIAL HYPERTENSION: ICD-10-CM

## 2023-02-17 DIAGNOSIS — E87.6 HYPOKALEMIA: ICD-10-CM

## 2023-02-17 DIAGNOSIS — K59.01 SLOW TRANSIT CONSTIPATION: ICD-10-CM

## 2023-02-17 DIAGNOSIS — Z96.651 STATUS POST TOTAL RIGHT KNEE REPLACEMENT: ICD-10-CM

## 2023-02-17 PROCEDURE — 99309 SBSQ NF CARE MODERATE MDM 30: CPT | Performed by: NURSE PRACTITIONER

## 2023-02-17 NOTE — PROGRESS NOTES
"[facility] GERIATRICS    Chief Complaint   Patient presents with     RECHECK     HPI:  Sweetie Thao is a 82 year old  (1940), who is being seen today for an episodic care visit at: [facility] ([facility]) [[id number]].     Per recent TCU provider progress notes:   82 y.o. female PMH HTN, PAD, CKD3 and right knee pain that has failed non-operative management hospitalized for planned right TKA.  ml. No complications. To TCU for rehab.     Today's concern is: episodic f/u pain, mobility and vs. Reports pain better with PRN oxycodone. No other concerns. BP range 126-167/76-80 and sats 97%. Sit to stand with 2WW. Constipation resolved.     Allergies, and PMH/PSH reviewed in [facility] today.  REVIEW OF SYSTEMS:  4 point ROS including Respiratory, CV, GI and , other than that noted in the HPI,  is negative    Objective:   /76   Pulse 65   Temp 97.7  F (36.5  C)   Resp 18   Ht 1.727 m (5' 8\")   Wt 88.4 kg (194 lb 12.8 oz)   SpO2 97%   BMI 29.62 kg/m    GENERAL APPEARANCE:  Alert, in no distress, cooperative  ENT:  Mouth normal, moist mucous membranes, normal hearing acuity  EYES:  Conjunctiva and lids normal  RESP:  no respiratory distress, on room air  CV: no LE edema  NEURO:   No facial asymmetry, speech clear  PSYCH:  oriented X 3, affect and mood normal     Recent labs in [facility] reviewed by me today.     Assessment/Plan:  Aftercare following right knee joint replacement surgery  Arthritis of knee  Physical deconditioning  Acute on chronic. Due to pain changed Lyrica to 150 mg BID, continue tylenol 1000 mg QID, vit d 1000 units daily. Stopped tramadol - not effective. Started oxycodone 5 mg PO TID and 5-10 mg PO every 4 hrs PRN pain, effective. Continue therapies eval and treat and f/u progress. Ortho F/U as recommended.     Hypertension, unspecified type  Chronic, continue PTA Norvasc 10 mg daily, HCTZ 25 mg daily, lopressor 50 mg BID, monitor vs and review ranges next visit. BMP check 2/10 " with Cr 0.87    PAD (peripheral artery disease) (H)  Dyslipidemia  Chronic, continue asa 81 mg daily, Lipitor 80 mg daily, Plavix 75 mg daily, monitor vascular status.     Postoperative anemia  Acute with surgery. MVI daily, CBC check on 2/10 with Hgb 10.8. Check PRN.     Slow transit constipation  Acute, started senna s 2 tabs daily. Effective.     Hypokalemia  Noted on 2/10 K 3.1. Replaced, K up to 3.8 on 2/13. Monitor PRN    MED REC REQUIRED  Post Medication Reconciliation Status:  Discharge medications reconciled, continue medications without change    Orders:  No new orders    Electronically signed by: HUDSON Rivers CNP

## 2023-02-21 DIAGNOSIS — Z47.1 AFTERCARE FOLLOWING LEFT KNEE JOINT REPLACEMENT SURGERY: ICD-10-CM

## 2023-02-21 DIAGNOSIS — Z96.652 AFTERCARE FOLLOWING LEFT KNEE JOINT REPLACEMENT SURGERY: ICD-10-CM

## 2023-02-21 DIAGNOSIS — G62.9 PERIPHERAL NEUROPATHY: Primary | ICD-10-CM

## 2023-02-21 RX ORDER — PREGABALIN 75 MG/1
CAPSULE ORAL
Qty: 60 CAPSULE | Refills: 0 | Status: SHIPPED | OUTPATIENT
Start: 2023-02-21 | End: 2023-02-23

## 2023-02-21 RX ORDER — OXYCODONE HYDROCHLORIDE 5 MG/1
TABLET ORAL
Qty: 30 TABLET | Refills: 0 | Status: SHIPPED | OUTPATIENT
Start: 2023-02-21 | End: 2023-02-22

## 2023-02-22 ENCOUNTER — DISCHARGE SUMMARY NURSING HOME (OUTPATIENT)
Dept: GERIATRICS | Facility: CLINIC | Age: 83
End: 2023-02-22
Payer: COMMERCIAL

## 2023-02-22 VITALS
OXYGEN SATURATION: 95 % | RESPIRATION RATE: 18 BRPM | DIASTOLIC BLOOD PRESSURE: 75 MMHG | HEIGHT: 68 IN | WEIGHT: 196.6 LBS | TEMPERATURE: 97.7 F | HEART RATE: 54 BPM | BODY MASS INDEX: 29.8 KG/M2 | SYSTOLIC BLOOD PRESSURE: 120 MMHG

## 2023-02-22 DIAGNOSIS — I10 ESSENTIAL HYPERTENSION: ICD-10-CM

## 2023-02-22 DIAGNOSIS — R53.81 PHYSICAL DECONDITIONING: ICD-10-CM

## 2023-02-22 DIAGNOSIS — K59.01 SLOW TRANSIT CONSTIPATION: ICD-10-CM

## 2023-02-22 DIAGNOSIS — Z47.1 AFTERCARE FOLLOWING LEFT KNEE JOINT REPLACEMENT SURGERY: Primary | ICD-10-CM

## 2023-02-22 DIAGNOSIS — E87.6 HYPOKALEMIA: ICD-10-CM

## 2023-02-22 DIAGNOSIS — I73.9 PAD (PERIPHERAL ARTERY DISEASE) (H): ICD-10-CM

## 2023-02-22 DIAGNOSIS — E78.5 DYSLIPIDEMIA: ICD-10-CM

## 2023-02-22 DIAGNOSIS — Z96.652 AFTERCARE FOLLOWING LEFT KNEE JOINT REPLACEMENT SURGERY: Primary | ICD-10-CM

## 2023-02-22 DIAGNOSIS — Z96.651 STATUS POST TOTAL RIGHT KNEE REPLACEMENT: ICD-10-CM

## 2023-02-22 DIAGNOSIS — D62 ACUTE BLOOD LOSS ANEMIA: ICD-10-CM

## 2023-02-22 DIAGNOSIS — M17.11 PRIMARY OSTEOARTHRITIS OF RIGHT KNEE: ICD-10-CM

## 2023-02-22 PROCEDURE — 99316 NF DSCHRG MGMT 30 MIN+: CPT | Performed by: NURSE PRACTITIONER

## 2023-02-22 NOTE — PROGRESS NOTES
[facility] GERIATRICS DISCHARGE SUMMARY  PATIENT'S NAME: Sweetie Thao  YOB: 1940  MEDICAL RECORD NUMBER:  [id number]  Place of Service where encounter took place:  [facility] ([facility]) [[id number]]    PRIMARY CARE PROVIDER AND CLINIC RESPONSIBLE AFTER TRANSFER:   VERONA Stone [address] / [place] [address]    Non-[facility] Provider     Transferring providers: HUDSON Rivesr CNP, Dr. Adalberto MD  Recent Hospitalization/ED:  [facility]  stay 2/1/23 to 2/4/23.  Date of SNF Admission: 2/4/23  Date of SNF (anticipated) Discharge: 2/22/23  Discharged to: previous independent home  Cognitive Scores: SLUMS: 26/30  Physical Function: up with walker  DME: No new DME needed    CODE STATUS/ADVANCE DIRECTIVES DISCUSSION:  Full Code   ALLERGIES: Amitriptyline, Contrast dye, Cymbalta, Dust mites, Gabapentin, Levofloxacin, Mold, and Nortriptyline    NURSING FACILITY COURSE   Medication Changes/Rationale:     STOPPED tramadol, oxycodone per patient request    Per recent TCU provider progress notes:   82 y.o. female PMH HTN, PAD, CKD3 and right knee pain that has failed non-operative management hospitalized for planned right TKA.  ml. No complications. To TCU for rehab.     Seen for discharge visit. Pain much improved, asks to stop narcotics. No headaches, dizziness, chest pain, dyspnea, bowel or bladder issues. Wt down 8 lbs since admission. BP range 106-154/67-83 and sats 95% room air. Home with home care as ordered below.     Summary of nursing facility stay:   Aftercare following right knee joint replacement surgery  Arthritis of knee  Physical deconditioning  Acute on chronic. Due to pain changed Lyrica to 150 mg BID, continue tylenol 1000 mg QID, vit d 1000 units daily. Stopped tramadol and oxycodone. Home with home care. Ortho F/U as recommended.     Hypertension, unspecified type  Chronic, continue PTA Norvasc 10 mg daily, HCTZ 25 mg  daily, lopressor 50 mg BID, monitor vs and review ranges next visit. BMP check 2/10 with Cr 0.87    PAD (peripheral artery disease) (H)  Dyslipidemia  Chronic, continue asa 81 mg daily, Lipitor 80 mg daily, Plavix 75 mg daily, monitor vascular status.     Postoperative anemia  Acute with surgery. MVI daily, CBC check on 2/10 with Hgb 10.8. Check PRN.     Slow transit constipation  Acute, started senna s 2 tabs daily. Effective.     Hypokalemia  Noted on 2/10 K 3.1. Replaced, K up to 3.8 on 2/13. Monitor PRN    Discharge Medications:  MED REC REQUIRED  Post Medication Reconciliation Status:  Discharge medications reconciled and changed, see notes/orders    Current Outpatient Medications   Medication Sig Dispense Refill     ACETAMINOPHEN PO Take 1,000 mg by mouth every 6 hours       amLODIPine (NORVASC) 10 MG tablet Take 1 tablet (10 mg) by mouth daily 30 tablet 0     amoxicillin (AMOXIL) 500 MG capsule Give 4 capsule by mouth as needed for Dental prevention-post knee replacement Take 4 capsules 1 hour prior to any dental procedure, including routine cleaning (Patient not taking: Reported on 2/7/2023)       aspirin 81 MG EC tablet Take 81 mg by mouth daily       ATORVASTATIN CALCIUM PO Take 80 mg by mouth daily       clopidogrel (PLAVIX) 75 MG tablet Take 75 mg by mouth daily       fluticasone (FLONASE) 50 MCG/ACT nasal spray Spray 1 spray into both nostrils daily as needed for rhinitis or allergies       hydrochlorothiazide (HYDRODIURIL) 25 MG tablet Take 1 tablet (25 mg) by mouth daily 30 tablet 0     metoprolol tartrate (LOPRESSOR) 50 MG tablet Take 1 tablet (50 mg) by mouth 2 times daily 60 tablet 0     multivitamin, therapeutic (THERA-VIT) TABS tablet Take 1 tablet by mouth daily       pregabalin (LYRICA) 75 MG capsule Take 150 mg PO twice daily 60 capsule 0     senna-docusate (SENOKOT-S/PERICOLACE) 8.6-50 MG tablet Take 2 tablets by mouth daily       VITAMIN D, CHOLECALCIFEROL, PO Take 1,000 Units by mouth daily  "       Controlled medications:   not applicable/none     Past Medical History:   Past Medical History:   Diagnosis Date     Allergic rhinitis      Chronic renal insufficiency      Edema of both legs      Endometriosis      Essential hypertension      HDL lipoprotein deficiency      HTN (hypertension)      Hypertriglyceridemia      Idiopathic neuropathy      Left knee DJD      Peripheral neuropathy      Pre-diabetes      Rash      Rectal polyp      Renal artery stenosis (H)      Toe ulcer, right (H)      Physical Exam:   Vitals: /75   Pulse 54   Temp 97.7  F (36.5  C)   Resp 18   Ht 1.727 m (5' 8\")   Wt 89.2 kg (196 lb 9.6 oz)   SpO2 95%   BMI 29.89 kg/m    BMI: Body mass index is 29.89 kg/m . GENERAL APPEARANCE:  Alert, in no distress, cooperative  ENT:  Mouth normal, moist mucous membranes, normal hearing acuity  EYES:  Conjunctiva and lids normal  RESP:  no respiratory distress, on room air  CV: no LE edema  NEURO:   No facial asymmetry, speech clear  PSYCH:  oriented X 3, affect and mood normal       SNF labs: Recent labs in [facility] reviewed by me today.     DISCHARGE PLAN:    Follow up labs: No labs orders/due    Medical Follow Up:      Follow up with primary care provider in 2-3 weeks  Follow up with specialist ortho as recommended     Current [facility] scheduled appointments:   No future appointments.     Discharge Services: Home Care:  Occupational Therapy, Physical Therapy, Registered Nurse, Home Health Aide and From:  [facility]    Discharge Instructions Verbalized to Patient at Discharge:     None    TOTAL DISCHARGE TIME:   Greater than 30 minutes  Electronically signed by:  HUDSON Rivers CNP     Home care Face to Face documentation done in [facility] attached to Home care orders for [facility].             "

## 2023-02-23 DIAGNOSIS — G89.29 OTHER CHRONIC PAIN: Primary | ICD-10-CM

## 2023-02-23 RX ORDER — PREGABALIN 75 MG/1
CAPSULE ORAL
Qty: 60 CAPSULE | Refills: 0 | Status: SHIPPED | OUTPATIENT
Start: 2023-02-23

## 2024-06-17 PROBLEM — Z71.89 ADVANCED DIRECTIVES, COUNSELING/DISCUSSION: Status: RESOLVED | Noted: 2018-04-04 | Resolved: 2024-06-17
